# Patient Record
Sex: FEMALE | Race: WHITE | NOT HISPANIC OR LATINO | Employment: PART TIME | URBAN - METROPOLITAN AREA
[De-identification: names, ages, dates, MRNs, and addresses within clinical notes are randomized per-mention and may not be internally consistent; named-entity substitution may affect disease eponyms.]

---

## 2017-12-14 ENCOUNTER — OFFICE VISIT (OUTPATIENT)
Dept: UROGYNECOLOGY | Facility: CLINIC | Age: 27
End: 2017-12-14
Payer: MEDICAID

## 2017-12-14 VITALS
DIASTOLIC BLOOD PRESSURE: 70 MMHG | HEIGHT: 65 IN | SYSTOLIC BLOOD PRESSURE: 110 MMHG | WEIGHT: 117.75 LBS | BODY MASS INDEX: 19.62 KG/M2

## 2017-12-14 DIAGNOSIS — Z30.9 ENCOUNTER FOR CONTRACEPTIVE MANAGEMENT, UNSPECIFIED TYPE: Primary | ICD-10-CM

## 2017-12-14 DIAGNOSIS — Z01.419 WELL WOMAN EXAM: ICD-10-CM

## 2017-12-14 DIAGNOSIS — Z12.4 ENCOUNTER FOR PAPANICOLAOU SMEAR FOR CERVICAL CANCER SCREENING: ICD-10-CM

## 2017-12-14 DIAGNOSIS — Z30.41 ENCOUNTER FOR SURVEILLANCE OF CONTRACEPTIVE PILLS: ICD-10-CM

## 2017-12-14 LAB
B-HCG UR QL: NEGATIVE
CTP QC/QA: YES

## 2017-12-14 PROCEDURE — 99999 PR PBB SHADOW E&M-NEW PATIENT-LVL III: CPT | Mod: PBBFAC,,, | Performed by: NURSE PRACTITIONER

## 2017-12-14 PROCEDURE — 81025 URINE PREGNANCY TEST: CPT | Mod: PBBFAC | Performed by: NURSE PRACTITIONER

## 2017-12-14 PROCEDURE — 99385 PREV VISIT NEW AGE 18-39: CPT | Mod: S$PBB,,, | Performed by: NURSE PRACTITIONER

## 2017-12-14 PROCEDURE — 88175 CYTOPATH C/V AUTO FLUID REDO: CPT

## 2017-12-14 PROCEDURE — 99203 OFFICE O/P NEW LOW 30 MIN: CPT | Mod: PBBFAC | Performed by: NURSE PRACTITIONER

## 2017-12-14 RX ORDER — DESOGESTREL AND ETHINYL ESTRADIOL 0.15-0.03
1 KIT ORAL DAILY
COMMUNITY
End: 2017-12-14 | Stop reason: CLARIF

## 2017-12-14 RX ORDER — DESOGESTREL AND ETHINYL ESTRADIOL 0.15-0.03
1 KIT ORAL DAILY
Qty: 84 TABLET | Refills: 3 | Status: SHIPPED | OUTPATIENT
Start: 2017-12-14 | End: 2018-11-09 | Stop reason: SDUPTHER

## 2017-12-14 NOTE — PATIENT INSTRUCTIONS
1. Well woman  --pap today  --takes 2-3 weeks for results    2. Contraception  --continue ocp's    3. RTC 1 year

## 2017-12-14 NOTE — PROGRESS NOTES
"2017    SUBJECTIVE:   27 y.o. female for annual exam.    History reviewed. No pertinent past medical history.    History reviewed. No pertinent surgical history.    Current Outpatient Prescriptions   Medication Sig Dispense Refill    desogestrel-ethinyl estradiol (APRI) 0.15-0.03 mg per tablet Take 1 tablet by mouth once daily.       No current facility-administered medications for this visit.      Allergies: Clindamycin   Patient's last menstrual period was 2017.      OB History      Para Term  AB Living    0 0 0 0 0 0    SAB TAB Ectopic Multiple Live Births    0 0 0 0 0            Well Woman:  Pap:2 years ago-- normal per patient report  Mammo:n/a  Colonoscopy:n/a  Dexa:n/a      Family History  Family History   Problem Relation Age of Onset    Cervical cancer Neg Hx     Endometrial cancer Neg Hx     Vaginal cancer Neg Hx     Ovarian cancer Neg Hx       Colon CA: No  Breast CA: No   CA: No      ROS:  Feeling well.   No dyspnea or chest pain on exertion.    No abdominal pain, change in bowel habits, black or bloody stools.    No urinary tract symptoms.   GYN ROS: normal menses, no abnormal bleeding, pelvic pain or discharge, no breast pain or new or enlarging lumps on self exam.   No neurological complaints.    OBJECTIVE:   The patient appears well, alert, oriented x 3, in no distress.  /70 (BP Location: Right arm, Patient Position: Sitting)   Ht 5' 5" (1.651 m)   Wt 53.4 kg (117 lb 11.6 oz)   LMP 2017   BMI 19.59 kg/m²   ENT normal.  Neck supple. No adenopathy or thyromegaly. SHERLYN.   Lungs are clear, good air entry, no wheezes, rhonchi or rales.   S1 and S2 normal, no murmurs, regular rate and rhythm.   Abdomen soft without tenderness, guarding, mass or organomegaly.   Extremities show no edema, normal peripheral pulses.   Neurological is normal, no focal findings.    BREAST EXAM: breasts appear normal, no suspicious masses, no skin or nipple changes or axillary " nodes    PELVIC EXAM:   VULVA: normal appearing vulva with no masses, tenderness or lesions,   VAGINA: normal appearing vagina with normal color and discharge, no lesions,  CERVIX: normal appearing cervix without discharge or lesions,   UTERUS: uterus is normal size, shape, consistency and nontender,   ADNEXA: no masses,       ASSESSMENT:   No diagnosis found.    PLAN:   1. Well woman  --pap today  --takes 2-3 weeks for results    2. Contraception  --continue ocp's    3. RTC 1 year      30 minutes were spent in face to face time with this patient  75 % of this time was spent in counseling and/or coordination of care  Althea GAN [facility]  Division of Female Pelvic Medicine and Reconstructive Surgery  Department of Obstetrics & Gynecology

## 2017-12-26 ENCOUNTER — TELEPHONE (OUTPATIENT)
Dept: UROGYNECOLOGY | Facility: CLINIC | Age: 27
End: 2017-12-26

## 2017-12-26 NOTE — TELEPHONE ENCOUNTER
----- Message from Garrick Miranda sent at 12/26/2017  4:19 PM CST -----  Contact: Pt  X_ 1st Request  _ 2nd Request  _ 3rd Request    Who:  CHANDNI HINOJOSA [[id number]]    Why: Patient is returning a call    What Number to Call Back: [phone]    When to Expect a call back: (Before the end of the day)  -- if call after 3:00 call back will be tomorrow.

## 2017-12-26 NOTE — TELEPHONE ENCOUNTER
Called pt and relay message of negative pap smear results. Pt voiced understanding and call was ended.

## 2017-12-26 NOTE — TELEPHONE ENCOUNTER
----- Message from Althea Hinojosa NP sent at 12/26/2017  3:05 PM CST -----  Please let patient know her pap smear was normal.  MARJORIE Crystal-BC

## 2018-11-09 DIAGNOSIS — Z30.41 ENCOUNTER FOR SURVEILLANCE OF CONTRACEPTIVE PILLS: ICD-10-CM

## 2018-11-09 RX ORDER — DESOGESTREL AND ETHINYL ESTRADIOL 0.15-0.03
1 KIT ORAL DAILY
Qty: 84 TABLET | Refills: 0 | Status: SHIPPED | OUTPATIENT
Start: 2018-11-09 | End: 2019-02-01 | Stop reason: SDUPTHER

## 2018-11-09 NOTE — TELEPHONE ENCOUNTER
----- Message from Joy Alicia sent at 11/9/2018  2:15 PM CST -----  Contact: Pt   Can the clinic reply in [facility]: No     Please refill the medication(s) listed below. Please call the patient when the prescription(s) is ready for  at the phone number [phone]    Medication #1: desogestrel-ethinyl estradiol (APRI) 0.15-0.03 mg per tablet    Medication #2:    Preferred Pharmacy: [facility]

## 2018-11-09 NOTE — TELEPHONE ENCOUNTER
Spoke to pt, pt was contacted regarding her request for OCP refill. Pt was informed that her OCP's can be refilled for one month, but for continued refills she will need to schedule a follow up appointment. FU for WWE scheduled 1/4/2019 at the [facility] location with Althea, due to her travels over the holidays.  Pt aware and verbalizes understanding.

## 2019-01-30 ENCOUNTER — TELEPHONE (OUTPATIENT)
Dept: UROGYNECOLOGY | Facility: CLINIC | Age: 29
End: 2019-01-30

## 2019-01-30 NOTE — TELEPHONE ENCOUNTER
[facility] pharmacy sent over a refill request on Isibloom 0.15mg-0.3mg tablet 28s. Informed the pharmacy pt needed to schedule an appointment before rx refill can be approved due to pt not being seen since 2017.  Called pt to schedule a follow up apt for rx refill request, Unable to to leave message due to phone number being disconnected.

## 2019-02-01 ENCOUNTER — OFFICE VISIT (OUTPATIENT)
Dept: UROGYNECOLOGY | Facility: CLINIC | Age: 29
End: 2019-02-01
Payer: MEDICAID

## 2019-02-01 VITALS
SYSTOLIC BLOOD PRESSURE: 110 MMHG | DIASTOLIC BLOOD PRESSURE: 80 MMHG | HEIGHT: 65 IN | WEIGHT: 123.69 LBS | BODY MASS INDEX: 20.61 KG/M2

## 2019-02-01 DIAGNOSIS — Z30.41 ENCOUNTER FOR SURVEILLANCE OF CONTRACEPTIVE PILLS: ICD-10-CM

## 2019-02-01 DIAGNOSIS — Z01.419 WELL WOMAN EXAM: Primary | ICD-10-CM

## 2019-02-01 PROCEDURE — 99999 PR PBB SHADOW E&M-EST. PATIENT-LVL III: ICD-10-PCS | Mod: PBBFAC,,, | Performed by: NURSE PRACTITIONER

## 2019-02-01 PROCEDURE — 99395 PR PREVENTIVE VISIT,EST,18-39: ICD-10-PCS | Mod: S$PBB,,, | Performed by: NURSE PRACTITIONER

## 2019-02-01 PROCEDURE — 99395 PREV VISIT EST AGE 18-39: CPT | Mod: S$PBB,,, | Performed by: NURSE PRACTITIONER

## 2019-02-01 PROCEDURE — 99213 OFFICE O/P EST LOW 20 MIN: CPT | Mod: PBBFAC | Performed by: NURSE PRACTITIONER

## 2019-02-01 PROCEDURE — 99999 PR PBB SHADOW E&M-EST. PATIENT-LVL III: CPT | Mod: PBBFAC,,, | Performed by: NURSE PRACTITIONER

## 2019-02-01 RX ORDER — DESOGESTREL AND ETHINYL ESTRADIOL 0.15-0.03
KIT ORAL
Qty: 84 TABLET | Refills: 4 | Status: SHIPPED | OUTPATIENT
Start: 2019-02-01 | End: 2020-03-02

## 2019-02-01 NOTE — PROGRESS NOTES
"2019    SUBJECTIVE:   28 y.o. female for annual exam.    History reviewed. No pertinent past medical history.    History reviewed. No pertinent surgical history.    Current Outpatient Medications   Medication Sig Dispense Refill    desogestrel-ethinyl estradiol (APRI) 0.15-0.03 mg per tablet Take active pills continuously x 3 months, then withdraw for menses 84 tablet 4     No current facility-administered medications for this visit.      Allergies: Clindamycin   Patient's last menstrual period was 2019.      OB History      Para Term  AB Living    0 0 0 0 0 0    SAB TAB Ectopic Multiple Live Births    0 0 0 0 0            Well Woman:  Pap:2017 normal  Mammo:n/a  Colonoscopy:n/a  Dexa:n/a      Family History  Family History   Problem Relation Age of Onset    Cervical cancer Neg Hx     Endometrial cancer Neg Hx     Vaginal cancer Neg Hx     Ovarian cancer Neg Hx       Colon CA: No  Breast CA: No   CA: No      ROS:  Feeling well.   No dyspnea or chest pain on exertion.    No abdominal pain, change in bowel habits, black or bloody stools.    No urinary tract symptoms.   GYN ROS: normal menses, no abnormal bleeding, pelvic pain or discharge, no breast pain or new or enlarging lumps on self exam.   No neurological complaints. Having frontal headaches mostly on L side for two months--no vision changes--relieved with caffeine and tylenol    OBJECTIVE:   The patient appears well, alert, oriented x 3, in no distress.  /80 (BP Location: Right arm, Patient Position: Sitting, BP Method: Medium (Manual))   Ht 5' 5" (1.651 m)   Wt 56.1 kg (123 lb 10.9 oz)   LMP 2019   BMI 20.58 kg/m²   ENT normal.  Neck supple. No adenopathy or thyromegaly. SHERLYN.   Lungs are clear, good air entry, no wheezes, rhonchi or rales.   S1 and S2 normal, no murmurs, regular rate and rhythm.   Abdomen soft without tenderness, guarding, mass or organomegaly.   Extremities show no edema, normal peripheral " pulses.   Neurological is normal, no focal findings.    BREAST EXAM: breasts appear normal, no suspicious masses, no skin or nipple changes or axillary nodes    PELVIC EXAM:   VULVA: normal appearing vulva with no masses, tenderness or lesions,   VAGINA: normal appearing vagina with normal color and discharge, no lesions,  CERVIX: normal appearing cervix without discharge or lesions,   UTERUS: uterus is normal size, shape, consistency and nontender,   ADNEXA: no masses,       ASSESSMENT:   1. Well woman exam     2. Encounter for surveillance of contraceptive pills  desogestrel-ethinyl estradiol (APRI) 0.15-0.03 mg per tablet       PLAN:   1. Well woman  --pap due 2020    2. Contraception  --continue ocp's    3. RTC 1 year      30 minutes were spent in face to face time with this patient  75 % of this time was spent in counseling and/or coordination of care  Althea GAN [facility]  Division of Female Pelvic Medicine and Reconstructive Surgery  Department of Obstetrics & Gynecology

## 2020-03-02 DIAGNOSIS — Z30.41 ENCOUNTER FOR SURVEILLANCE OF CONTRACEPTIVE PILLS: ICD-10-CM

## 2020-03-02 RX ORDER — DESOGESTREL AND ETHINYL ESTRADIOL 0.15-0.03
KIT ORAL
Qty: 84 TABLET | Refills: 0 | Status: ON HOLD | OUTPATIENT
Start: 2020-03-02 | End: 2020-03-19 | Stop reason: HOSPADM

## 2020-03-02 NOTE — TELEPHONE ENCOUNTER
Pt is requesting a refill on desogestrel-ethinyl estradiol 0.15-0.03 mg. Pt annual appt is scheduled on 3/13/20 at 1:30 pm.

## 2020-03-02 NOTE — TELEPHONE ENCOUNTER
----- Message from German Molina sent at 3/2/2020  2:45 PM CST -----  PT NEEDS A   MONTH REFILL ON HER BIRTH CONTROL UNTIL SHE CAN GET [phone]

## 2020-03-13 ENCOUNTER — NURSE TRIAGE (OUTPATIENT)
Dept: ADMINISTRATIVE | Facility: CLINIC | Age: 30
End: 2020-03-13

## 2020-03-13 ENCOUNTER — HOSPITAL ENCOUNTER (INPATIENT)
Facility: HOSPITAL | Age: 30
LOS: 6 days | Discharge: HOME | DRG: 572 | End: 2020-03-19
Attending: EMERGENCY MEDICINE | Admitting: EMERGENCY MEDICINE
Payer: MEDICAID

## 2020-03-13 DIAGNOSIS — L08.9 NECK INFECTION: Primary | ICD-10-CM

## 2020-03-13 DIAGNOSIS — R22.1 LOCALIZED SWELLING, MASS OR LUMP OF NECK: ICD-10-CM

## 2020-03-13 DIAGNOSIS — R59.1 LYMPHADENOPATHY: ICD-10-CM

## 2020-03-13 DIAGNOSIS — R65.10 SIRS (SYSTEMIC INFLAMMATORY RESPONSE SYNDROME): ICD-10-CM

## 2020-03-13 DIAGNOSIS — Z03.89 RULED OUT FOR MYOCARDIAL INFARCTION: ICD-10-CM

## 2020-03-13 DIAGNOSIS — R21 RASH: ICD-10-CM

## 2020-03-13 DIAGNOSIS — E87.6 HYPOKALEMIA: ICD-10-CM

## 2020-03-13 DIAGNOSIS — R00.0 TACHYCARDIA: ICD-10-CM

## 2020-03-13 DIAGNOSIS — L02.11 NECK ABSCESS: ICD-10-CM

## 2020-03-13 DIAGNOSIS — R22.1 NECK MASS: ICD-10-CM

## 2020-03-13 PROBLEM — A41.9 SEPSIS: Status: ACTIVE | Noted: 2020-03-13

## 2020-03-13 LAB
ALBUMIN SERPL BCP-MCNC: 3 G/DL (ref 3.5–5.2)
ALP SERPL-CCNC: 85 U/L (ref 55–135)
ALT SERPL W/O P-5'-P-CCNC: 20 U/L (ref 10–44)
ANION GAP SERPL CALC-SCNC: 8 MMOL/L (ref 8–16)
AST SERPL-CCNC: 31 U/L (ref 10–40)
B-HCG UR QL: NEGATIVE
BASOPHILS # BLD AUTO: 0.03 K/UL (ref 0–0.2)
BASOPHILS NFR BLD: 0.2 % (ref 0–1.9)
BILIRUB SERPL-MCNC: 0.4 MG/DL (ref 0.1–1)
BUN SERPL-MCNC: 8 MG/DL (ref 6–20)
CALCIUM SERPL-MCNC: 8.5 MG/DL (ref 8.7–10.5)
CHLORIDE SERPL-SCNC: 106 MMOL/L (ref 95–110)
CO2 SERPL-SCNC: 22 MMOL/L (ref 23–29)
CREAT SERPL-MCNC: 0.6 MG/DL (ref 0.5–1.4)
CTP QC/QA: YES
DIFFERENTIAL METHOD: ABNORMAL
EOSINOPHIL # BLD AUTO: 0 K/UL (ref 0–0.5)
EOSINOPHIL NFR BLD: 0 % (ref 0–8)
ERYTHROCYTE [DISTWIDTH] IN BLOOD BY AUTOMATED COUNT: 12.8 % (ref 11.5–14.5)
EST. GFR  (AFRICAN AMERICAN): >60 ML/MIN/1.73 M^2
EST. GFR  (NON AFRICAN AMERICAN): >60 ML/MIN/1.73 M^2
GLUCOSE SERPL-MCNC: 96 MG/DL (ref 70–110)
HCT VFR BLD AUTO: 37 % (ref 37–48.5)
HETEROPH AB SERPL QL IA: NEGATIVE
HGB BLD-MCNC: 12 G/DL (ref 12–16)
IMM GRANULOCYTES # BLD AUTO: 0.14 K/UL (ref 0–0.04)
IMM GRANULOCYTES NFR BLD AUTO: 0.8 % (ref 0–0.5)
INFLUENZA A, MOLECULAR: NEGATIVE
INFLUENZA B, MOLECULAR: NEGATIVE
LYMPHOCYTES # BLD AUTO: 2.1 K/UL (ref 1–4.8)
LYMPHOCYTES NFR BLD: 11.7 % (ref 18–48)
MCH RBC QN AUTO: 30.3 PG (ref 27–31)
MCHC RBC AUTO-ENTMCNC: 32.4 G/DL (ref 32–36)
MCV RBC AUTO: 93 FL (ref 82–98)
MONOCYTES # BLD AUTO: 1.3 K/UL (ref 0.3–1)
MONOCYTES NFR BLD: 7.3 % (ref 4–15)
NEUTROPHILS # BLD AUTO: 14.1 K/UL (ref 1.8–7.7)
NEUTROPHILS NFR BLD: 80 % (ref 38–73)
NRBC BLD-RTO: 0 /100 WBC
PLATELET # BLD AUTO: 233 K/UL (ref 150–350)
PMV BLD AUTO: 11.6 FL (ref 9.2–12.9)
POTASSIUM SERPL-SCNC: 3.4 MMOL/L (ref 3.5–5.1)
PROT SERPL-MCNC: 8.4 G/DL (ref 6–8.4)
RBC # BLD AUTO: 3.96 M/UL (ref 4–5.4)
SODIUM SERPL-SCNC: 136 MMOL/L (ref 136–145)
SPECIMEN SOURCE: NORMAL
WBC # BLD AUTO: 17.62 K/UL (ref 3.9–12.7)

## 2020-03-13 PROCEDURE — 81025 URINE PREGNANCY TEST: CPT | Performed by: EMERGENCY MEDICINE

## 2020-03-13 PROCEDURE — 80053 COMPREHEN METABOLIC PANEL: CPT

## 2020-03-13 PROCEDURE — 87632 RESP VIRUS 6-11 TARGETS: CPT

## 2020-03-13 PROCEDURE — 85025 COMPLETE CBC W/AUTO DIFF WBC: CPT

## 2020-03-13 PROCEDURE — 99291 CRITICAL CARE FIRST HOUR: CPT | Mod: 25

## 2020-03-13 PROCEDURE — 99285 EMERGENCY DEPT VISIT HI MDM: CPT | Mod: ,,, | Performed by: EMERGENCY MEDICINE

## 2020-03-13 PROCEDURE — S0077 INJECTION, CLINDAMYCIN PHOSP: HCPCS | Performed by: EMERGENCY MEDICINE

## 2020-03-13 PROCEDURE — 93005 ELECTROCARDIOGRAM TRACING: CPT

## 2020-03-13 PROCEDURE — 25000003 PHARM REV CODE 250: Performed by: EMERGENCY MEDICINE

## 2020-03-13 PROCEDURE — 63600175 PHARM REV CODE 636 W HCPCS: Performed by: EMERGENCY MEDICINE

## 2020-03-13 PROCEDURE — 99285 PR EMERGENCY DEPT VISIT,LEVEL V: ICD-10-PCS | Mod: ,,, | Performed by: EMERGENCY MEDICINE

## 2020-03-13 PROCEDURE — 87502 INFLUENZA DNA AMP PROBE: CPT

## 2020-03-13 PROCEDURE — 11000001 HC ACUTE MED/SURG PRIVATE ROOM

## 2020-03-13 PROCEDURE — 86308 HETEROPHILE ANTIBODY SCREEN: CPT

## 2020-03-13 PROCEDURE — U0002 COVID-19 LAB TEST NON-CDC: HCPCS

## 2020-03-13 RX ORDER — ONDANSETRON 2 MG/ML
4 INJECTION INTRAMUSCULAR; INTRAVENOUS EVERY 8 HOURS PRN
Status: DISCONTINUED | OUTPATIENT
Start: 2020-03-13 | End: 2020-03-16

## 2020-03-13 RX ORDER — IBUPROFEN 200 MG
16 TABLET ORAL
Status: DISCONTINUED | OUTPATIENT
Start: 2020-03-13 | End: 2020-03-19 | Stop reason: HOSPADM

## 2020-03-13 RX ORDER — KETOROLAC TROMETHAMINE 10 MG/1
10 TABLET, FILM COATED ORAL EVERY 6 HOURS PRN
Status: DISPENSED | OUTPATIENT
Start: 2020-03-13 | End: 2020-03-14

## 2020-03-13 RX ORDER — POTASSIUM CHLORIDE 20 MEQ/1
40 TABLET, EXTENDED RELEASE ORAL ONCE
Status: DISCONTINUED | OUTPATIENT
Start: 2020-03-14 | End: 2020-03-13

## 2020-03-13 RX ORDER — CLINDAMYCIN PHOSPHATE 600 MG/50ML
600 INJECTION, SOLUTION INTRAVENOUS
Status: DISCONTINUED | OUTPATIENT
Start: 2020-03-13 | End: 2020-03-13

## 2020-03-13 RX ORDER — POTASSIUM CHLORIDE 20 MEQ/1
40 TABLET, EXTENDED RELEASE ORAL
Status: COMPLETED | OUTPATIENT
Start: 2020-03-13 | End: 2020-03-13

## 2020-03-13 RX ORDER — ENOXAPARIN SODIUM 100 MG/ML
40 INJECTION SUBCUTANEOUS EVERY 24 HOURS
Status: DISCONTINUED | OUTPATIENT
Start: 2020-03-14 | End: 2020-03-17

## 2020-03-13 RX ORDER — SODIUM CHLORIDE 0.9 % (FLUSH) 0.9 %
10 SYRINGE (ML) INJECTION
Status: DISCONTINUED | OUTPATIENT
Start: 2020-03-13 | End: 2020-03-17

## 2020-03-13 RX ORDER — TALC
6 POWDER (GRAM) TOPICAL NIGHTLY PRN
Status: DISCONTINUED | OUTPATIENT
Start: 2020-03-13 | End: 2020-03-19 | Stop reason: HOSPADM

## 2020-03-13 RX ORDER — CLINDAMYCIN HYDROCHLORIDE 150 MG/1
450 CAPSULE ORAL EVERY 8 HOURS
Status: DISCONTINUED | OUTPATIENT
Start: 2020-03-13 | End: 2020-03-13

## 2020-03-13 RX ORDER — DEXAMETHASONE SODIUM PHOSPHATE 4 MG/ML
8 INJECTION, SOLUTION INTRA-ARTICULAR; INTRALESIONAL; INTRAMUSCULAR; INTRAVENOUS; SOFT TISSUE
Status: COMPLETED | OUTPATIENT
Start: 2020-03-13 | End: 2020-03-13

## 2020-03-13 RX ORDER — ACETAMINOPHEN 325 MG/1
650 TABLET ORAL EVERY 4 HOURS PRN
Status: DISCONTINUED | OUTPATIENT
Start: 2020-03-13 | End: 2020-03-19 | Stop reason: HOSPADM

## 2020-03-13 RX ORDER — DEXAMETHASONE SODIUM PHOSPHATE 4 MG/ML
8 INJECTION, SOLUTION INTRA-ARTICULAR; INTRALESIONAL; INTRAMUSCULAR; INTRAVENOUS; SOFT TISSUE EVERY 8 HOURS
Status: DISCONTINUED | OUTPATIENT
Start: 2020-03-14 | End: 2020-03-16

## 2020-03-13 RX ORDER — IBUPROFEN 200 MG
24 TABLET ORAL
Status: DISCONTINUED | OUTPATIENT
Start: 2020-03-13 | End: 2020-03-19 | Stop reason: HOSPADM

## 2020-03-13 RX ORDER — PROCHLORPERAZINE EDISYLATE 5 MG/ML
5 INJECTION INTRAMUSCULAR; INTRAVENOUS EVERY 6 HOURS PRN
Status: DISCONTINUED | OUTPATIENT
Start: 2020-03-13 | End: 2020-03-19 | Stop reason: HOSPADM

## 2020-03-13 RX ORDER — ACETAMINOPHEN 325 MG/1
650 TABLET ORAL
Status: COMPLETED | OUTPATIENT
Start: 2020-03-13 | End: 2020-03-13

## 2020-03-13 RX ORDER — IPRATROPIUM BROMIDE AND ALBUTEROL SULFATE 2.5; .5 MG/3ML; MG/3ML
3 SOLUTION RESPIRATORY (INHALATION) EVERY 6 HOURS PRN
Status: DISCONTINUED | OUTPATIENT
Start: 2020-03-13 | End: 2020-03-16

## 2020-03-13 RX ORDER — ONDANSETRON 4 MG/1
4 TABLET, ORALLY DISINTEGRATING ORAL EVERY 6 HOURS PRN
Status: DISCONTINUED | OUTPATIENT
Start: 2020-03-13 | End: 2020-03-19 | Stop reason: HOSPADM

## 2020-03-13 RX ORDER — SODIUM CHLORIDE 0.9 % (FLUSH) 0.9 %
10 SYRINGE (ML) INJECTION
Status: DISCONTINUED | OUTPATIENT
Start: 2020-03-13 | End: 2020-03-19 | Stop reason: HOSPADM

## 2020-03-13 RX ORDER — VANCOMYCIN HCL IN 5 % DEXTROSE 1G/250ML
1000 PLASTIC BAG, INJECTION (ML) INTRAVENOUS
Status: DISCONTINUED | OUTPATIENT
Start: 2020-03-14 | End: 2020-03-15

## 2020-03-13 RX ADMIN — SODIUM CHLORIDE 1000 ML: 0.9 INJECTION, SOLUTION INTRAVENOUS at 04:03

## 2020-03-13 RX ADMIN — ACETAMINOPHEN 650 MG: 325 TABLET ORAL at 06:03

## 2020-03-13 RX ADMIN — CLINDAMYCIN IN 5 PERCENT DEXTROSE 600 MG: 12 INJECTION, SOLUTION INTRAVENOUS at 07:03

## 2020-03-13 RX ADMIN — KETOROLAC TROMETHAMINE 10 MG: 10 TABLET, FILM COATED ORAL at 06:03

## 2020-03-13 RX ADMIN — SODIUM CHLORIDE, SODIUM LACTATE, POTASSIUM CHLORIDE, AND CALCIUM CHLORIDE 1000 ML: .6; .31; .03; .02 INJECTION, SOLUTION INTRAVENOUS at 06:03

## 2020-03-13 RX ADMIN — POTASSIUM CHLORIDE 40 MEQ: 1500 TABLET, EXTENDED RELEASE ORAL at 04:03

## 2020-03-13 RX ADMIN — SODIUM CHLORIDE 1000 ML: 0.9 INJECTION, SOLUTION INTRAVENOUS at 01:03

## 2020-03-13 RX ADMIN — DEXAMETHASONE SODIUM PHOSPHATE 8 MG: 4 INJECTION, SOLUTION INTRA-ARTICULAR; INTRALESIONAL; INTRAMUSCULAR; INTRAVENOUS; SOFT TISSUE at 06:03

## 2020-03-13 NOTE — ED PROVIDER NOTES
Encounter Date: 3/13/2020       History     Chief Complaint   Patient presents with    Oral Swelling     Pt reports throat swelling since Tuesday. Pt received antibiotics from . Pt went to ER and had CT scan which was inconclusive.      30 yo W presents with a chief complaint of neck pain and swelling.  Patient reports left neck pain and swelling began 3 days prior.  At onset of symptoms, patient had a fever up to 104°.  It was associated with flu-like symptoms including diarrhea, myalgias, headache, ear pain.  Most of the symptoms have since resolved but patient had a fever of 102 yesterday and neck pain persisted so she went to urgent care.  At urgent care she was found to be influenza negative.  She appeared to have received an IM injection of Toradol and Zosyn and referred to the emergency department.  While in the ED, patient had a CT that revealed left cervical chain lymphadenopathy with left anterolateral neck swelling and edema.  Hypodense rim enhancing collection is seen which may represent left neck abscess versus necrotic/suppurative lymph node or necrotic lesion.  Infectious and neoplastic processes should be considered.  Patient experienced some improvement of symptoms and elected to pursue outpatient follow-up.  She was instructed to call the nurse hotline if her condition worsened.  This morning, patient awoke with worsening pain and swelling.  She feels that the swelling is now spreading to the front of her neck.  She feels that her throat is getting compressed.  Pain was severe this morning and could barely talk in needed her boyfriend to help left upper head.  She called the nurse hotline who recommended she come to the emergency department.  Patient was able to take a Toradol and has since experienced some improvement in pain.  Currently at a 6/10.  She still reports change in voice it is force.  She feels spit pull in the back of her throat.  She was able to swallow putting to take the  Toradol.  But she has not taken the clindamycin yet because she was concerned that she didn't eat enough.  No fever x24 hr.  No recent travel.  Patient did report that she had some URI symptoms head cold 7 days prior.  This resolved 6 days prior.  Five days prior, patient went to a birthday party and was around a group of people.  She has had no recent travel.  No exposure to those under suspicion for COVID.  No chest pain or shortness of breath.  She has not received her influenza    From medical history standpoint, patient feels that she has an undiagnosed autoimmune issue. Pt gets symmetrical rash and swelling to legs  that last occurred 5 days prior.  It has since resolved.  Additionally, in 2013 patient had an episode of parotitis.  Patient was initially prescribed clindamycin but developed vomiting and she was subsequently switched to another antibiotic that resolved it.  Additionally, patient had 2 spontaneous pneumothoraces in 2015.          Review of patient's allergies indicates:   Allergen Reactions    Clindamycin Nausea And Vomiting     History reviewed. No pertinent past medical history.  History reviewed. No pertinent surgical history.  Family History   Problem Relation Age of Onset    Cervical cancer Neg Hx     Endometrial cancer Neg Hx     Vaginal cancer Neg Hx     Ovarian cancer Neg Hx      Social History     Tobacco Use    Smoking status: Never Smoker    Smokeless tobacco: Never Used   Substance Use Topics    Alcohol use: Yes    Drug use: No     Review of Systems   Constitutional: Positive for appetite change, chills and fever.   HENT: Positive for congestion, ear pain, sore throat, trouble swallowing and voice change. Negative for ear discharge.    Eyes: Negative for discharge.   Respiratory: Negative for cough and shortness of breath.    Cardiovascular: Negative for chest pain.   Gastrointestinal: Negative for abdominal pain.   Endocrine: Negative for polyuria.   Genitourinary:  Negative for dysuria.   Musculoskeletal: Negative for back pain.   Skin: Negative for wound.   Allergic/Immunologic: Negative for immunocompromised state.   Neurological: Positive for headaches.   Hematological: Positive for adenopathy. Does not bruise/bleed easily.   Psychiatric/Behavioral: Negative for confusion.       Physical Exam     Initial Vitals   BP Pulse Resp Temp SpO2   03/13/20 1227 03/13/20 1215 03/13/20 1227 03/13/20 1215 03/13/20 1215   136/83 (!) 120 (!) 22 100 °F (37.8 °C) 100 %      MAP       --                Physical Exam    Nursing note and vitals reviewed.  Constitutional: She appears well-developed and well-nourished. She is not diaphoretic. No distress.   HENT:   Head: Normocephalic and atraumatic.   Posterior oropharynx clear with uvula midline, no erythema, no tonsillar enlargement or exudate  Tolerating secretions without difficulty  No muffled voice, but it is somewhat hoarse  TMs clear bilat   Eyes: EOM are normal. Right eye exhibits no discharge. Left eye exhibits no discharge. No scleral icterus.   Neck: Normal range of motion. No thyromegaly present. No JVD present.   7 cm of induration along left submandibular region extending posteriorly and inferiorly, inferior left cervical chain with lymphadenopathy, no stridor, tenderness to palpation of lymphadenopathy and some of anterior trachea  No erythema, warmth, skin changes throughout neck   Cardiovascular: Regular rhythm and intact distal pulses. Exam reveals no gallop and no friction rub.    No murmur heard.  Mild tachycardia to low 100s   Pulmonary/Chest: Breath sounds normal. No respiratory distress. She has no wheezes. She has no rhonchi. She has no rales. She exhibits no tenderness.   Patient speaking complete sentences on room air no acute distress  No tachypnea noted   Abdominal: Soft. Bowel sounds are normal. She exhibits no distension and no mass. There is no tenderness. There is no rebound and no guarding.   Musculoskeletal:  Normal range of motion. She exhibits no edema or tenderness.   Lymphadenopathy:     She has cervical adenopathy.   Neurological: She is alert and oriented to person, place, and time. She has normal strength. No cranial nerve deficit or sensory deficit.   Skin: Skin is warm and dry. Capillary refill takes less than 2 seconds.   Psychiatric:   Mildly anxious, but redirectable         ED Course   Procedures  Labs Reviewed   CBC W/ AUTO DIFFERENTIAL - Abnormal; Notable for the following components:       Result Value    WBC 17.62 (*)     RBC 3.96 (*)     Immature Granulocytes 0.8 (*)     Gran # (ANC) 14.1 (*)     Immature Grans (Abs) 0.14 (*)     Mono # 1.3 (*)     Gran% 80.0 (*)     Lymph% 11.7 (*)     All other components within normal limits   COMPREHENSIVE METABOLIC PANEL - Abnormal; Notable for the following components:    Potassium 3.4 (*)     CO2 22 (*)     Calcium 8.5 (*)     Albumin 3.0 (*)     All other components within normal limits   INFLUENZA A & B BY MOLECULAR   RESPIRATORY VIRAL PANEL BY PCR   HETEROPHILE AB SCREEN   SARS-COV-2 (COVID-19) QUALITATIVE PCR   POCT URINE PREGNANCY     EKG Readings: (Independently Interpreted)   Rhythm: Sinus Tachycardia. Heart Rate: 102. ST Segments: Normal ST Segments. T Waves: Normal. Axis: Right Axis Deviation.     ECG Results          EKG 12-lead (Final result)  Result time 03/13/20 16:45:11    Final result by Interface, Lab In [facility] (03/13/20 16:45:11)                 Narrative:    Test Reason : R00.0,    Vent. Rate : 102 BPM     Atrial Rate : 102 BPM     P-R Int : 124 ms          QRS Dur : 082 ms      QT Int : 326 ms       P-R-T Axes : 080 091 049 degrees     QTc Int : 424 ms    Age and gender specific analysis  Sinus tachycardia  Right atrial enlargement  Rightward axis  Pulmonary disease pattern  Abnormal ECG  No previous ECGs available  Confirmed by DENY THOMAS MD ([id number]) on 3/13/2020 4:44:58 PM    Referred By: [email or web address]   SELF           Confirmed By:DENY THOMAS  MD                            Imaging Results    None          Medical Decision Making:   History:   I obtained history from: someone other than patient.  Old Medical Records: I decided to obtain old medical records.  Initial Assessment:   30 yo W with pmhx spontaneous ptx x2 (2015), parotitis (2013) presents with a chief complaint of neck pain and swelling.  Initially this seemed to have worsened from yesterday when she had her CT scan but after taking Toradol, patient reports significant improvement.  She is now hungry and does not appear to be in respiratory distress. Of note, patient was very tachycardic on arrival to the 140s.  Patient was informed that given the ongoing COVID outbreak, she must be  from her boyfriend come to the emergency department alone.  She was very tearful and anxious.  On my exam, her vitals were improved.  Differential Diagnosis:   Abscess, lymphoma, reactive lymph nodes, viral syndrome, influenza, mono, COVID-19, infectious issues  Clinical Tests:   Lab Tests: Ordered  Radiological Study: Reviewed  Medical Tests: Ordered  ED Management:  Despite the initial vital signs abnormalities, patient was much more calm and on my evaluation her vitals were normal.  I do not feel that she is septic.  Patient was able to take her home antibiotic by mouth and eat pudding in the ED. Airway does appear stable.  Will obtain Monospot, influenza test, respiratory viral panel, COVID-19 testing.  Will obtain labs.  ENT consulted for recommendations.    Reassessment:  CBC with a leukocytosis of 17.6.  This improved from 20 of yesterday.  Hemoglobin reassuring.  Labs with mild hypokalemia, will replace.  Influenza is negative.  Repeat vital signs did document hypoxia of 91% but this appears to be erroneous.  On repeat assessment, patient has a temperature of 99.7°, tachycardic with heart rate 112, respirations 22, pulse of 112.  She appears very well at this time.  Again, I do not feel that she is  septic.  She has been able tolerate p.o..  She is handling secretions and tolerating p.o. without difficulty.  Will administer additional 1 L bolus.  Case discussed with ENT.  Initial resident evaluated patient and is concern for possible lymphoma versus suppurative lymphadenitis.  They are making decision whether to admit for IV antibiotics versus biopsy.  Additionally, they plan to perform a nasopharyngeal scope.  Due to COVID-19, the ENT department is actively developing a policy on how to manage r/o COVID pts and they informed myself that they will not be able to make a decision until after the policy is solidified at 1730.    Reassessment:  ENT elects not to perform nasopharyngeal scope at this time.  They feel that should patient stay overnight, they will evaluate her in the morning to see if she needs to have the mass drained, otherwise she may follow-up as an outpatient.  On repeat assessment, patient is tearful and feels that the swelling has worsened.  She remains phonating well and tolerating her secretions.  I do not appreciate any change in the size of the mass.  No stridor.  Patient remains tachycardic but she is tearful.  In the setting of multiple doses of oral antibiotics and persistent pain and swelling, will bring in the hospital overnight for IV antibiotics for suppurative lymphadenitis.  Will administer Tylenol, steroids, additional IV fluids.  Obs per case management.  Given tachycardia, tachypnea, leukocytosis, patient has SIRS criteria and hospitalist recommends inpatient.  Patient does not appear septic to me.  She has some elevated heart rate and respiratory rate readings when crying but when calm they appear normal.  She appears stable for admission to [facility].                                       Clinical Impression:       ICD-10-CM ICD-9-CM   1. Neck infection L08.9 136.9   2. Tachycardia R00.0 785.0   3. Localized swelling, mass or lump of neck R22.1 784.2   4. Lymphadenopathy R59.1 785.6    5. Hypokalemia E87.6 276.8   6. SIRS (systemic inflammatory response syndrome) R65.10 995.90         Disposition:   Disposition: Admitted     ED Disposition Condition    Admit                           Jonas Ramirez MD  03/13/20 1759       Jonas Ramirez MD  03/13/20 1808

## 2020-03-13 NOTE — ED TRIAGE NOTES
Pt reports diarrhea x 1 week with intermittent nausea. Pt with fevers and running nose. Pt reports recent negative flu swab. Denies cough.

## 2020-03-13 NOTE — TELEPHONE ENCOUNTER
Reason for Disposition   Nursing judgment    Protocols used: ST NO PROTOCOL CALL: SICK ADULT-A-[place]      Dee was seen in [facility] ED last night for neck swelling, not discharged until 0815 this morning.  She called triage line now to report the swelling is significantly worse.  Was confined to the left, but now has begun swelling under the chin, mid-throat area, and entire neck is painful.  Says she is having difficulty swallowing.  No difficulty breathing. Per [facility] triage protocol, recommend return to ED now for evaluation.  She states she has not been able to eat anything due to the swelling of her neck and difficulty swallowing, and says she will try to eat a few bites of food before going to ED.  Strongly encourage her to leave now, do not eat anything prior to evaluation, and go to [facility] ED, as ED wait time is less now than [facility].  She states she will.  No pcp.  Please contact caller directly with any additional care advice.

## 2020-03-13 NOTE — ED NOTES
Bed: 29  Expected date: 3/13/20  Expected time: 12:33 PM  Means of arrival:   Comments:  prashanth

## 2020-03-13 NOTE — ED NOTES
LOC: The patient is awake, alert, and oriented to place, time, situation. Affect is appropriate.  Speech is appropriate and clear.     APPEARANCE: Patient resting comfortably in no acute distress.  Patient is clean and well groomed.    SKIN: The skin is warm and dry; color consistent with ethnicity.  Patient has normal skin turgor and moist mucus membranes.  Skin intact; no breakdown or bruising noted.     MUSCULOSKELETAL: Patient moving upper and lower extremities without difficulty.  Denies weakness.     RESPIRATORY: Airway is open and patent. Respirations spontaneous, even, easy, and non-labored.  Patient has a normal effort and rate.  No accessory muscle use noted. Denies cough.     CARDIAC:  Normal rhythm and rate noted.  No peripheral edema noted. No complaints of chest pain.      ABDOMEN: Soft and non tender to palpation.  No distention noted. Pt reports intermittent diarrhea and nausea and vomiting x1 episode.    NEUROLOGIC: Eyes open spontaneously.  Behavior appropriate to situation.  Follows commands; facial expression symmetrical.  Purposeful motor response noted; normal sensation in all extremities.

## 2020-03-13 NOTE — CONSULTS
[facility]  Otorhinolaryngology-Head & Neck Surgery  Consult Note    Patient Name: Dee Mills  MRN: [id number]  Code Status: No Order  Admission Date: 3/13/2020  Hospital Length of Stay: 0 days  Attending Physician: Jonas Ramirez MD  Primary Care Provider: Primary Doctor No    Patient information was obtained from patient.     Inpatient consult to ENT  Consult performed by: Isidro Lees MD  Consult ordered by: Jonas Ramirez MD        Subjective:     Chief Complaint/Reason for Admission: left neck swelling with fevers    History of Present Illness: 29 y/o F presents to the ED with left anterior neck swelling that started two days ago (3/11/19). Patient reports that last Friday and Saturday, she felt like she had a head cold, which appeared to completely resolve on Sunday and Monday. On Tuesday (3/10/19), patient says she woke up with a fever of 100, that worsened throughout the day to a Tmax of 104. She says that she also experienced left neck pain, but no swelling. She noticed the left anterior neck swelling start on Wednesday, two days ago. She presented to urgent care on Thursday, who subsequently sent her to the ED. She had a CT, which was read as inconclusive, and discharged on clindamycin, ketorolac, and ondansetron (hx of nausea with clindamycin). She says that this morning, she felt the swelling was significantly worse, and therefore presented to the ED. She notes she was nearly aphonic this morning, though speaking clearly now, but also states she feels her voice is not at baseline and is somewhat raspy compared to normal. She also complains of trouble/pain with swallowing, which has improved since this morning. Associated symptoms include headache, bilateral ear pain L>R, and myalgias.       Medications:  Continuous Infusions:   Scheduled Meds: clindamycin  PRN Meds:  ketorolac, ondansetron     Current Facility-Administered Medications on File Prior to Encounter   Medication     [COMPLETED] clindamycin capsule 450 mg    [COMPLETED] iohexoL (OMNIPAQUE 350) injection 75 mL    [COMPLETED] ketorolac injection 15 mg    [COMPLETED] ondansetron disintegrating tablet 4 mg    [COMPLETED] piperacillin-tazobactam 4.5 g in dextrose 5 % 100 mL IVPB (ready to mix system)    [COMPLETED] sodium chloride 0.9% bolus 1,000 mL    [COMPLETED] sodium chloride 0.9% bolus 1,000 mL     Current Outpatient Medications on File Prior to Encounter   Medication Sig    clindamycin (CLEOCIN) 150 MG capsule Take 3 capsules (450 mg total) by mouth 3 (three) times daily. for 7 days    ISIBLOOM 0.15-0.03 mg per tablet TAKE ACTIVE PILLS CONTIUOUSLY FOR 3 MONTHS, THEN WITHDRAW FOR MENSES    ketorolac (TORADOL) 10 mg tablet Take 1 tablet (10 mg total) by mouth every 6 (six) hours as needed for Pain. If needed, wait 2 days between last dose and getting refill    ondansetron (ZOFRAN-ODT) 4 MG TbDL Take 1 tablet (4 mg total) by mouth every 6 (six) hours as needed (nausea or vomiting).       Review of patient's allergies indicates:   Allergen Reactions    Clindamycin Nausea And Vomiting       PMH:  Parotitis, two spontaneous pneumothoraces (one requiring a chest tube), gout, Raynaud's  Patient also concerned about a long-standing autoimmune process (describes severe leg swelling)    History reviewed. No pertinent surgical history.  Family History     Lung cancer in grandmother, raynaud's         Tobacco Use    Smoking status: Once or twice a month    Smokeless tobacco: Never Used   Substance and Sexual Activity    Alcohol use: Yes    Drug use: Marijuana    Sexual activity: Yes     Partners: Male     Birth control/protection: On birth control, no barrier protection     Review of Systems   Constitutional: Positive for fatigue and fever.   HENT: Positive for ear pain, facial swelling, sore throat, trouble swallowing and voice change. Negative for drooling, ear discharge and hearing loss.    Eyes: Negative for redness and  visual disturbance.   Respiratory: Negative for apnea, shortness of breath, wheezing and stridor.    Cardiovascular: Negative for chest pain and palpitations.   Gastrointestinal: Negative for abdominal distention, abdominal pain and nausea.   Endocrine: Negative.  Negative for cold intolerance and heat intolerance.   Genitourinary: Negative.    Musculoskeletal: Positive for myalgias and neck pain.   Skin: Negative for color change and rash.   Neurological: Positive for speech difficulty and headaches. Negative for dizziness and numbness.   Hematological: Positive for adenopathy.   Psychiatric/Behavioral: Negative for agitation, behavioral problems and confusion.     Objective:     Vital Signs (Most Recent):  Temp: 99.7 °F (37.6 °C) (03/13/20 1543)  Pulse: (!) 112 (03/13/20 1542)  Resp: (!) 22 (03/13/20 1227)  BP: 114/68 (03/13/20 1417)  SpO2: 97 % (03/13/20 1542) Vital Signs (24h Range):  Temp:  [97.9 °F (36.6 °C)-100 °F (37.8 °C)] 99.7 °F (37.6 °C)  Pulse:  [102-143] 112  Resp:  [14-22] 22  SpO2:  [91 %-100 %] 97 %  BP: (102-136)/(59-83) 114/68     Weight: 53.1 kg (117 lb)  Body mass index is 19.47 kg/m².        Constitutional:   She is oriented to person, place, and time. She appears well-developed and well-nourished. She appears alert. Normal speech.      Head:  Normocephalic and atraumatic.     Ears:    Right Ear: No drainage. Tympanic membrane is not erythematous. No middle ear effusion.   Left Ear: No drainage. Tympanic membrane is not erythematous.  No middle ear effusion.     Nose:  Nose normal including turbinates, nasal mucosa, sinuses and nasal septum.     Mouth/Throat  Oropharynx clear and moist without lesions or asymmetry. No oropharyngeal exudate (Left oropharynx slightly maldonado compared to right). +1.          Neck:      She has cervical adenopathy (4 x 6 cm area of submandibular fullness, diffuse adenopathy palpable throughout bilateral anterior neck, tender to palpation throughout L>R).      Cardiovascular:   Regular rhythm.      Pulmonary/Chest:   Effort normal.     Psychiatric:   She has a normal mood and affect. Her speech is normal.     Neurological:   She is alert and oriented to person, place, and time.     Skin:   No abrasions, lacerations, lesions, or rashes.     Flexible laryngoscopy deferred at this time due to COVID rule-out status.    Significant Labs:  CBC:   Recent Labs   Lab 03/13/20  1402   WBC 17.62*   RBC 3.96*   HGB 12.0   HCT 37.0      MCV 93   MCH 30.3   MCHC 32.4     BMP:   Recent Labs   Lab 03/13/20  1402   GLU 96      CO2 22*   BUN 8   CREATININE 0.6   CALCIUM 8.5*          Significant Diagnostics:  I have reviewed all pertinent imaging results/findings within the past 24 hours.    Assessment/Plan:     Neck mass  30 y/o F with a left submandibular neck mass present since 3/11/19 and fevers since 3/10/19. CT scan shows diffuse lymphadenopathy with left hypodense rim enhancing collection which may represent left neck abscess versus necrotic/suppurative lymph node or necrotic lesion. WBC 17.6. Febrile. Currently on a course of clindamycin. No respiratory distress. Reported hoarseness compared to baseline. Flexible laryngoscopy deferred at this time due to COVID rule-out status. Concern for lymphoproliferative process (lymphoma?) vs suppurative lymphadenitis.    - defer flexible laryngoscopy for now, airway stable  - may consider consulting medicine to evaluate need for IV antibiotics  - possible drainage to be further evaluated at a later time  - recommend IV abx, decadron 8 mg q 8 hours  - NPO at midnight. Stop NSAIDs  - discussed with staff      VTE Risk Mitigation (From admission, onward)    None          Thank you for your consult. I will follow-up with patient. Please contact us if you have any additional questions.

## 2020-03-13 NOTE — SUBJECTIVE & OBJECTIVE
Medications:  Continuous Infusions:   Scheduled Meds: clindamycin  PRN Meds:  ketorolac, ondansetron     Current Facility-Administered Medications on File Prior to Encounter   Medication    [COMPLETED] clindamycin capsule 450 mg    [COMPLETED] iohexoL (OMNIPAQUE 350) injection 75 mL    [COMPLETED] ketorolac injection 15 mg    [COMPLETED] ondansetron disintegrating tablet 4 mg    [COMPLETED] piperacillin-tazobactam 4.5 g in dextrose 5 % 100 mL IVPB (ready to mix system)    [COMPLETED] sodium chloride 0.9% bolus 1,000 mL    [COMPLETED] sodium chloride 0.9% bolus 1,000 mL     Current Outpatient Medications on File Prior to Encounter   Medication Sig    clindamycin (CLEOCIN) 150 MG capsule Take 3 capsules (450 mg total) by mouth 3 (three) times daily. for 7 days    ISIBLOOM 0.15-0.03 mg per tablet TAKE ACTIVE PILLS CONTIUOUSLY FOR 3 MONTHS, THEN WITHDRAW FOR MENSES    ketorolac (TORADOL) 10 mg tablet Take 1 tablet (10 mg total) by mouth every 6 (six) hours as needed for Pain. If needed, wait 2 days between last dose and getting refill    ondansetron (ZOFRAN-ODT) 4 MG TbDL Take 1 tablet (4 mg total) by mouth every 6 (six) hours as needed (nausea or vomiting).       Review of patient's allergies indicates:   Allergen Reactions    Clindamycin Nausea And Vomiting       PMH:  Parotitis, two spontaneous pneumothoraces (one requiring a chest tube), gout, Raynaud's  Patient also concerned about a long-standing autoimmune process (describes severe leg swelling)    History reviewed. No pertinent surgical history.  Family History     Lung cancer in grandmother, raynaud's         Tobacco Use    Smoking status: Once or twice a month    Smokeless tobacco: Never Used   Substance and Sexual Activity    Alcohol use: Yes    Drug use: Marijuana    Sexual activity: Yes     Partners: Male     Birth control/protection: On birth control, no barrier protection     Review of Systems   Constitutional: Positive for fatigue and  fever.   HENT: Positive for ear pain, facial swelling, sore throat, trouble swallowing and voice change. Negative for drooling, ear discharge and hearing loss.    Eyes: Negative for redness and visual disturbance.   Respiratory: Negative for apnea, shortness of breath, wheezing and stridor.    Cardiovascular: Negative for chest pain and palpitations.   Gastrointestinal: Negative for abdominal distention, abdominal pain and nausea.   Endocrine: Negative.  Negative for cold intolerance and heat intolerance.   Genitourinary: Negative.    Musculoskeletal: Positive for myalgias and neck pain.   Skin: Negative for color change and rash.   Neurological: Positive for speech difficulty and headaches. Negative for dizziness and numbness.   Hematological: Positive for adenopathy.   Psychiatric/Behavioral: Negative for agitation, behavioral problems and confusion.     Objective:     Vital Signs (Most Recent):  Temp: 99.7 °F (37.6 °C) (03/13/20 1543)  Pulse: (!) 112 (03/13/20 1542)  Resp: (!) 22 (03/13/20 1227)  BP: 114/68 (03/13/20 1417)  SpO2: 97 % (03/13/20 1542) Vital Signs (24h Range):  Temp:  [97.9 °F (36.6 °C)-100 °F (37.8 °C)] 99.7 °F (37.6 °C)  Pulse:  [102-143] 112  Resp:  [14-22] 22  SpO2:  [91 %-100 %] 97 %  BP: (102-136)/(59-83) 114/68     Weight: 53.1 kg (117 lb)  Body mass index is 19.47 kg/m².        Constitutional:   She is oriented to person, place, and time. She appears well-developed and well-nourished. She appears alert. Normal speech.      Head:  Normocephalic and atraumatic.     Ears:    Right Ear: No drainage. Tympanic membrane is not erythematous. No middle ear effusion.   Left Ear: No drainage. Tympanic membrane is not erythematous.  No middle ear effusion.     Nose:  Nose normal including turbinates, nasal mucosa, sinuses and nasal septum.     Mouth/Throat  Oropharynx clear and moist without lesions or asymmetry. No oropharyngeal exudate (Left oropharynx slightly maldonado compared to right). +1.           Neck:      She has cervical adenopathy (4 x 6 cm area of submandibular fullness, diffuse adenopathy palpable throughout bilateral anterior neck, tender to palpation throughout L>R).     Cardiovascular:   Regular rhythm.      Pulmonary/Chest:   Effort normal.     Psychiatric:   She has a normal mood and affect. Her speech is normal.     Neurological:   She is alert and oriented to person, place, and time.     Skin:   No abrasions, lacerations, lesions, or rashes.     Flexible laryngoscopy deferred at this time due to COVID rule-out status.    Significant Labs:  CBC:   Recent Labs   Lab 03/13/20  1402   WBC 17.62*   RBC 3.96*   HGB 12.0   HCT 37.0      MCV 93   MCH 30.3   MCHC 32.4     BMP:   Recent Labs   Lab 03/13/20  1402   GLU 96      CO2 22*   BUN 8   CREATININE 0.6   CALCIUM 8.5*          Significant Diagnostics:  I have reviewed all pertinent imaging results/findings within the past 24 hours.

## 2020-03-13 NOTE — HPI
31 y/o F presents to the ED with left anterior neck swelling that started two days ago (3/11/19). Patient reports that last Friday and Saturday, she felt like she had a head cold, which appeared to completely resolve on Sunday and Monday. On Tuesday (3/10/19), patient says she woke up with a fever of 100, that worsened throughout the day to a Tmax of 104. She says that she also experienced left neck pain, but no swelling. She noticed the left anterior neck swelling start on Wednesday, two days ago. She presented to urgent care on Thursday, who subsequently sent her to the ED. She had a CT, which was read as inconclusive, and discharged on clindamycin, ketorolac, and ondansetron (hx of nausea with clindamycin). She says that this morning, she felt the swelling was significantly worse, and therefore presented to the ED. She notes she was nearly aphonic this morning, though speaking clearly now, but also states she feels her voice is not at baseline and is somewhat raspy compared to normal. She also complains of trouble/pain with swallowing, which has improved since this morning. Associated symptoms include headache, bilateral ear pain L>R, and myalgias.

## 2020-03-13 NOTE — ED NOTES
Patient swabbed for COVID-19 by myself. Specimen collected per hospital policy. PPE worn. Specimen transported to lab by myself, Marii Ashley RN

## 2020-03-14 PROBLEM — Z20.822 SUSPECTED COVID-19 VIRUS INFECTION: Status: ACTIVE | Noted: 2020-03-14

## 2020-03-14 PROBLEM — J12.9 VIRAL PNEUMONIA: Status: ACTIVE | Noted: 2020-03-14

## 2020-03-14 LAB
ANION GAP SERPL CALC-SCNC: 5 MMOL/L (ref 8–16)
BASOPHILS # BLD AUTO: 0.01 K/UL (ref 0–0.2)
BASOPHILS NFR BLD: 0.1 % (ref 0–1.9)
BUN SERPL-MCNC: 7 MG/DL (ref 6–20)
CALCIUM SERPL-MCNC: 8.4 MG/DL (ref 8.7–10.5)
CHLORIDE SERPL-SCNC: 108 MMOL/L (ref 95–110)
CO2 SERPL-SCNC: 23 MMOL/L (ref 23–29)
CREAT SERPL-MCNC: 0.6 MG/DL (ref 0.5–1.4)
CRP SERPL-MCNC: 198.2 MG/L (ref 0–8.2)
DIFFERENTIAL METHOD: ABNORMAL
EOSINOPHIL # BLD AUTO: 0 K/UL (ref 0–0.5)
EOSINOPHIL NFR BLD: 0 % (ref 0–8)
ERYTHROCYTE [DISTWIDTH] IN BLOOD BY AUTOMATED COUNT: 12.8 % (ref 11.5–14.5)
EST. GFR  (AFRICAN AMERICAN): >60 ML/MIN/1.73 M^2
EST. GFR  (NON AFRICAN AMERICAN): >60 ML/MIN/1.73 M^2
GLUCOSE SERPL-MCNC: 222 MG/DL (ref 70–110)
HCT VFR BLD AUTO: 31.5 % (ref 37–48.5)
HGB BLD-MCNC: 10.2 G/DL (ref 12–16)
IMM GRANULOCYTES # BLD AUTO: 0.07 K/UL (ref 0–0.04)
IMM GRANULOCYTES NFR BLD AUTO: 0.7 % (ref 0–0.5)
LYMPHOCYTES # BLD AUTO: 1.3 K/UL (ref 1–4.8)
LYMPHOCYTES NFR BLD: 13.5 % (ref 18–48)
MCH RBC QN AUTO: 30.4 PG (ref 27–31)
MCHC RBC AUTO-ENTMCNC: 32.4 G/DL (ref 32–36)
MCV RBC AUTO: 94 FL (ref 82–98)
MONOCYTES # BLD AUTO: 0.3 K/UL (ref 0.3–1)
MONOCYTES NFR BLD: 2.9 % (ref 4–15)
NEUTROPHILS # BLD AUTO: 8.1 K/UL (ref 1.8–7.7)
NEUTROPHILS NFR BLD: 82.8 % (ref 38–73)
NRBC BLD-RTO: 0 /100 WBC
PLATELET # BLD AUTO: 222 K/UL (ref 150–350)
PMV BLD AUTO: 11.7 FL (ref 9.2–12.9)
POTASSIUM SERPL-SCNC: 3.8 MMOL/L (ref 3.5–5.1)
PROCALCITONIN SERPL IA-MCNC: 0.06 NG/ML
RBC # BLD AUTO: 3.36 M/UL (ref 4–5.4)
SODIUM SERPL-SCNC: 136 MMOL/L (ref 136–145)
WBC # BLD AUTO: 9.8 K/UL (ref 3.9–12.7)

## 2020-03-14 PROCEDURE — 80048 BASIC METABOLIC PNL TOTAL CA: CPT

## 2020-03-14 PROCEDURE — 25000003 PHARM REV CODE 250: Performed by: HOSPITALIST

## 2020-03-14 PROCEDURE — 63600175 PHARM REV CODE 636 W HCPCS: Performed by: HOSPITALIST

## 2020-03-14 PROCEDURE — 99222 1ST HOSP IP/OBS MODERATE 55: CPT | Mod: ,,, | Performed by: INTERNAL MEDICINE

## 2020-03-14 PROCEDURE — 99222 PR INITIAL HOSPITAL CARE,LEVL II: ICD-10-PCS | Mod: ,,, | Performed by: INTERNAL MEDICINE

## 2020-03-14 PROCEDURE — 86140 C-REACTIVE PROTEIN: CPT

## 2020-03-14 PROCEDURE — 11000001 HC ACUTE MED/SURG PRIVATE ROOM

## 2020-03-14 PROCEDURE — 84145 PROCALCITONIN (PCT): CPT

## 2020-03-14 PROCEDURE — 63600175 PHARM REV CODE 636 W HCPCS: Performed by: INTERNAL MEDICINE

## 2020-03-14 PROCEDURE — 85025 COMPLETE CBC W/AUTO DIFF WBC: CPT

## 2020-03-14 RX ORDER — KETOROLAC TROMETHAMINE 10 MG/1
10 TABLET, FILM COATED ORAL EVERY 6 HOURS PRN
Status: DISPENSED | OUTPATIENT
Start: 2020-03-14 | End: 2020-03-15

## 2020-03-14 RX ADMIN — DEXAMETHASONE SODIUM PHOSPHATE 8 MG: 4 INJECTION, SOLUTION INTRA-ARTICULAR; INTRALESIONAL; INTRAMUSCULAR; INTRAVENOUS; SOFT TISSUE at 10:03

## 2020-03-14 RX ADMIN — ACETAMINOPHEN 650 MG: 325 TABLET ORAL at 01:03

## 2020-03-14 RX ADMIN — ENOXAPARIN SODIUM 40 MG: 100 INJECTION SUBCUTANEOUS at 05:03

## 2020-03-14 RX ADMIN — VANCOMYCIN HYDROCHLORIDE 1000 MG: 1 INJECTION, POWDER, LYOPHILIZED, FOR SOLUTION INTRAVENOUS at 12:03

## 2020-03-14 RX ADMIN — PIPERACILLIN SODIUM AND TAZOBACTAM SODIUM 4.5 G: 4; .5 INJECTION, POWDER, LYOPHILIZED, FOR SOLUTION INTRAVENOUS at 09:03

## 2020-03-14 RX ADMIN — PIPERACILLIN SODIUM AND TAZOBACTAM SODIUM 4.5 G: 4; .5 INJECTION, POWDER, LYOPHILIZED, FOR SOLUTION INTRAVENOUS at 05:03

## 2020-03-14 RX ADMIN — DEXAMETHASONE SODIUM PHOSPHATE 8 MG: 4 INJECTION, SOLUTION INTRA-ARTICULAR; INTRALESIONAL; INTRAMUSCULAR; INTRAVENOUS; SOFT TISSUE at 02:03

## 2020-03-14 RX ADMIN — VANCOMYCIN HYDROCHLORIDE 1500 MG: 1.5 INJECTION, POWDER, LYOPHILIZED, FOR SOLUTION INTRAVENOUS at 01:03

## 2020-03-14 RX ADMIN — ACETAMINOPHEN 650 MG: 325 TABLET ORAL at 10:03

## 2020-03-14 RX ADMIN — DEXAMETHASONE SODIUM PHOSPHATE 8 MG: 4 INJECTION, SOLUTION INTRA-ARTICULAR; INTRALESIONAL; INTRAMUSCULAR; INTRAVENOUS; SOFT TISSUE at 01:03

## 2020-03-14 RX ADMIN — PIPERACILLIN SODIUM AND TAZOBACTAM SODIUM 4.5 G: 4; .5 INJECTION, POWDER, LYOPHILIZED, FOR SOLUTION INTRAVENOUS at 01:03

## 2020-03-14 NOTE — PROGRESS NOTES
Pharmacokinetic Initial Assessment: IV Vancomycin    Assessment/Plan:    Initiate intravenous vancomycin with loading dose of 1500 mg once followed by a maintenance dose of vancomycin 1000mg IV every 12 hours  Desired empiric serum trough concentration is 10 to 15 mcg/mL  Draw vancomycin trough level 30 min prior to fourth dose on 3/15 at approximately 1130  Pharmacy will continue to follow and monitor vancomycin.      Please contact pharmacy at extension [phone] with any questions regarding this assessment.     Thank you for the consult,   Sugey Orourke       Patient brief summary:  Dee Mills is a 29 y.o. female initiated on antimicrobial therapy with IV Vancomycin for treatment of suspected skin & soft tissue infection    Drug Allergies:   Review of patient's allergies indicates:   Allergen Reactions    Clindamycin Nausea And Vomiting       Actual Body Weight:   56.6kg    Renal Function:   Estimated Creatinine Clearance: 124.7 mL/min (based on SCr of 0.6 mg/dL).,     Dialysis Method (if applicable):  N/A    CBC (last 72 hours):  Recent Labs   Lab Result Units 03/12/20 2222 03/13/20  1402   WBC K/uL 20.06* 17.62*   Hemoglobin g/dL 12.0 12.0   Hematocrit % 36.1* 37.0   Platelets K/uL 235 233   Gran% % 74.0* 80.0*   Lymph% % 10.0* 11.7*   Mono% % 16.0* 7.3   Eosinophil% % 0.0 0.0   Basophil% % 0.0 0.2   Differential Method  Manual Automated       Metabolic Panel (last 72 hours):  Recent Labs   Lab Result Units 03/12/20 2222 03/13/20  1402   Sodium mmol/L 133* 136   Potassium mmol/L 3.2* 3.4*   Chloride mmol/L 99 106   CO2 mmol/L 24 22*   Glucose mg/dL 153* 96   BUN, Bld mg/dL 10 8   Creatinine mg/dL 1.0 0.6   Albumin g/dL 3.6 3.0*   Total Bilirubin mg/dL 0.2 0.4   Alkaline Phosphatase U/L 65 85   AST U/L 24 31   ALT U/L 17 20       Drug levels (last 3 results):  No results for input(s): VANCOMYCINRA, VANCOMYCINPE, VANCOMYCINTR in the last 72 hours.    Microbiologic Results:  Microbiology Results (last 7 days)      Procedure Component Value Units Date/Time    Influenza A & B by Molecular [[id number]] Collected:  03/13/20 1402    Order Status:  Completed Specimen:  Nasopharyngeal Swab Updated:  03/13/20 1456     Influenza A, Molecular Negative     Influenza B, Molecular Negative     Flu A & B Source Nasal swab    Respiratory Viral Panel by PCR [facility]; Nasal Swab [[id number]] Collected:  03/13/20 1403    Order Status:  Sent Specimen:  Respiratory Updated:  03/13/20 1410

## 2020-03-14 NOTE — PROGRESS NOTES
[facility]  Otorhinolaryngology-Head & Neck Surgery  Progress Note    Subjective:     Post-Op Info:  * No surgery found *      Hospital Day: 2     Interval History: NAEON. Patient is feeling better this morning.    Medications:  Continuous Infusions:  Scheduled Meds:   dexamethasone  8 mg Intravenous Q8H    enoxaparin  40 mg Subcutaneous Daily    piperacillin-tazobactam (ZOSYN) IVPB  4.5 g Intravenous Q8H    vancomycin (VANCOCIN) IVPB  1,000 mg Intravenous Q12H     PRN Meds:acetaminophen, albuterol-ipratropium, glucose, glucose, ketorolac, melatonin, ondansetron, ondansetron, prochlorperazine, sodium chloride 0.9%, sodium chloride 0.9%     Review of patient's allergies indicates:   Allergen Reactions    Clindamycin Nausea And Vomiting     Objective:     Vital Signs (24h Range):  Temp:  [97 °F (36.1 °C)-100.1 °F (37.8 °C)] 97.2 °F (36.2 °C)  Pulse:  [] 75  Resp:  [16-22] 18  SpO2:  [91 %-100 %] 98 %  BP: (105-136)/(63-83) 107/78       Lines/Drains/Airways     None                 Physical Exam      Significant Labs:  All pertinent labs from the last 24 hours have been reviewed.  WBC wnl today    Significant Diagnostics:  I have reviewed all pertinent imaging results/findings within the past 24 hours.    Assessment/Plan:     * Neck mass  30 y/o F with a left submandibular neck mass present since 3/11/19 and fevers since 3/10/19. CT scan shows diffuse lymphadenopathy with left hypodense rim enhancing collection which may represent left neck abscess versus necrotic/suppurative lymph node or necrotic lesion. WBC 17.6. Febrile. Currently on a course of clindamycin. No respiratory distress. Reported hoarseness compared to baseline. Flexible laryngoscopy deferred at this time due to COVID rule-out status.     Improving on abx and steroids    - defer flexible laryngoscopy for now, airway stable  -cont abx, steroids  -can eat today. Please make NPO at midnight tonight  -COVID test pending  -may  tea tomorrow depending on how she looks        Karel Vickers Jr., MD  Otorhinolaryngology-Head & Neck Surgery  [facility]

## 2020-03-14 NOTE — H&P
"Hospital Medicine  History and Physical  [facility]      Patient Name: Dee Mills  MRN:  [id number]  Hospital Medicine Team: [facility] A Maverick Riley MD  Date of Admission:  3/13/2020     Length of Stay:  LOS: 1 day     Principal Problem:  Viral pneumonia      Chief complaint: Neck pain    HPI    Ms. Mills is a 29-year-old woman with no known medical history who presented with neck pain.    She was in her usual state of health until Friday when she experienced a "head cold," which she characterized by a sore throat, headache, and a dry cough. She took some over the counter medications with improvement in her symptoms to the extent that over the weekend she was able to go to a birthday and go canoeing. She reports "probably drinking a little too much" on Monday, and on Tuesday was feeling terrible, febrile with diffuse myalgias and diarrhea, along with worsening left sided neck swelling and tenderness. Her Tmax at home was 104. She tried both ibuprofen and tylenol with transient relief, went to urgent care on Thursday and was given toradol with improvement. She went to [facility] the day prior to admission and was discharged on toradol + clinda with plans to follow-up with ENT as outpatient, but returned here when the swelling dramatically worsened.    On arrival here she was tachycardic but otherwise hemodynamically stable. She was given antibiotics and steroids in the ED with significant improvement. Due to her recent history of fever and upper respiratory symptoms, she was admitted to my service for COVID rule-out    As an aside, she is fearful of an underlying undiagnosed AI disorder. She has no family history (though estranged from mother's side). The symptoms are absent now, but over the last 10 years she experiences episodes of incapacitating leg pain with a bright red rash. Massage helps the pain, but worsens the rash.    Review of Systems    Constitutional: +fever, chills, " NS  HENT: +sore throat, neck swelling and pain  Eyes: Negative for photophobia, visual disturbance.   Respiratory: Negative for cough, shortness of breath.    Cardiovascular: Negative for chest pain, palpitations, leg swelling.   Gastrointestinal: +diarrhea Negative for abdominal pain, constipation, diarrhea, nausea, vomiting.   Endocrine: Negative for cold intolerance, heat intolerance.   Genitourinary: Negative for dysuria, frequency.   Musculoskeletal: +myalgias Negative for arthralgias.   Skin: Negative for rash, wound, erythema (currently)  Neurological: Negative for dizziness, syncope, weakness, light-headedness.   Psychiatric/Behavioral: Negative for confusion, hallucinations, anxiety    History reviewed. No pertinent past medical history.  History reviewed. No pertinent surgical history.     Family History   Problem Relation Age of Onset    Cervical cancer Neg Hx     Endometrial cancer Neg Hx     Vaginal cancer Neg Hx     Ovarian cancer Neg Hx      Social History     Socioeconomic History    Marital status: Single     Spouse name: Not on file    Number of children: Not on file    Years of education: Not on file    Highest education level: Not on file   Occupational History    Not on file   Social Needs    Financial resource strain: Not on file    Food insecurity:     Worry: Not on file     Inability: Not on file    Transportation needs:     Medical: Not on file     Non-medical: Not on file   Tobacco Use    Smoking status: Never Smoker    Smokeless tobacco: Never Used   Substance and Sexual Activity    Alcohol use: Yes    Drug use: No    Sexual activity: Yes     Partners: Male     Birth control/protection: None   Lifestyle    Physical activity:     Days per week: Not on file     Minutes per session: Not on file    Stress: Not on file   Relationships    Social connections:     Talks on phone: Not on file     Gets together: Not on file     Attends Jain service: Not on file     Active  member of club or organization: Not on file     Attends meetings of clubs or organizations: Not on file     Relationship status: Not on file   Other Topics Concern    Not on file   Social History Narrative    Not on file       Medications  Scheduled Meds:   dexamethasone  8 mg Intravenous Q8H    enoxaparin  40 mg Subcutaneous Daily    piperacillin-tazobactam (ZOSYN) IVPB  4.5 g Intravenous Q8H    vancomycin (VANCOCIN) IVPB  1,000 mg Intravenous Q12H     Continuous Infusions:  PRN Meds:.acetaminophen, albuterol-ipratropium, glucose, glucose, ketorolac, melatonin, ondansetron, ondansetron, prochlorperazine, sodium chloride 0.9%, sodium chloride 0.9%    Allergies  Clindamycin    Physical Examination    Temp:  [97 °F (36.1 °C)-100.1 °F (37.8 °C)]   Pulse:  []   Resp:  [16-22]   BP: (105-136)/(63-83)   SpO2:  [91 %-100 %]     Gen: NAD, conversant  Head: NC, AT  Eyes: PERRLA, EOMI  Throat: MMM, OP clear, left-sided neck swelling, TTP  CV: RRR, no M/R/G, no peripheral edema  Resp: CTAB, no increased work of breathing on room air  GI: Soft, NT, ND, +BS  Ext: MAEW, no c/c/e  Neuro: AAOx3, CN grossly intact, no focal neurologic deficits  Psychiatry: Normal mood, normal affect    Laboratory:    Recent Labs   Lab 03/12/20 2222 03/13/20 1402 03/14/20  0444   WBC 20.06* 17.62* 9.80   HGB 12.0 12.0 10.2*   HCT 36.1* 37.0 31.5*    233 222     Recent Labs   Lab 03/12/20 2222 03/13/20  1402 03/14/20  0444   * 136 136   K 3.2* 3.4* 3.8   CL 99 106 108   CO2 24 22* 23   BUN 10 8 7   CREATININE 1.0 0.6 0.6   * 96 222*   CALCIUM 9.1 8.5* 8.4*     Recent Labs   Lab 03/12/20 2222 03/13/20  1402   ALKPHOS 65 85   ALT 17 20   AST 24 31   ALBUMIN 3.6 3.0*   PROT 9.5* 8.4   BILITOT 0.2 0.4      No results for input(s): POCTGLUCOSE in the last 168 hours.  No results for input(s): CPK, CPKMB, MB, TROPONINI in the last 72 hours.    Recent Labs     03/13/20  0137   LACTATE 0.8        No results for input(s):  POCTGLUCOSE in the last 168 hours.  No results found for: LABA1C, HGBA1C      Microbiology:  Microbiology Results (last 7 days)     Procedure Component Value Units Date/Time    Influenza A & B by Molecular [[id number]] Collected:  03/13/20 1402    Order Status:  Completed Specimen:  Nasopharyngeal Swab Updated:  03/13/20 1456     Influenza A, Molecular Negative     Influenza B, Molecular Negative     Flu A & B Source Nasal swab    Respiratory Viral Panel by PCR [facility]; Nasal Swab [[id number]] Collected:  03/13/20 1403    Order Status:  Sent Specimen:  Respiratory Updated:  03/13/20 1419            Imaging  ECG Results          EKG 12-lead (Final result)  Result time 03/13/20 16:45:11    Final result by Interface, Lab In [facility] (03/13/20 16:45:11)                 Narrative:    Test Reason : R00.0,    Vent. Rate : 102 BPM     Atrial Rate : 102 BPM     P-R Int : 124 ms          QRS Dur : 082 ms      QT Int : 326 ms       P-R-T Axes : 080 091 049 degrees     QTc Int : 424 ms    Age and gender specific analysis  Sinus tachycardia  Right atrial enlargement  Rightward axis  Pulmonary disease pattern  Abnormal ECG  No previous ECGs available  Confirmed by DENY THOMAS MD ([id number]) on 3/13/2020 4:44:58 PM    Referred By: [email or web address]   SELF           Confirmed By:DENY THOMAS MD                                No results found for this or any previous visit.      X-Ray Chest AP Portable  Narrative: EXAMINATION:  XR CHEST AP PORTABLE    CLINICAL HISTORY:  concern for covid 19;    TECHNIQUE:  Single frontal view of the chest was performed.    COMPARISON:  None    FINDINGS:  Single chest view is submitted.  The cardiomediastinal silhouette appears appropriate.  There is appearance suggesting mild basilar interstitial infiltrate there is no confluent infiltrate or consolidation, there is no evidence for significant pleural effusion or pneumothorax.  The osseous structures appear intact.  Impression: Mild interstitial basilar infiltrate  "without confluent infiltrate or consolidation    Electronically signed by: Rupert Cook  Date:    03/14/2020  Time:    05:34          Assessment and Plan:    Active Hospital Problems    Diagnosis  POA    *Neck mass [R22.1]  Yes    Neck infection [L08.9]  Yes      Resolved Hospital Problems   No resolved problems to display.       Person under investigation for COVID-19  Viral pneumonia    - COVID-19 testing sent.  - Infection Control notified  - Isolation:   - Airborne and Droplet Precautions  - N95 masks must be fit tested, wear eye protection  - 20 second hand hygiene   - Limit visitors per hospital policy  - Diagnostics   - CBC shows resolving leukocytosis   - CMP (collected 3/13) largely normal.    - .2   - PCT negative    - d-dimer, ferritin, troponin, LDH deferred   - ECG shows sinus tach though with rightward axis   - rapid Flu negative   - RIP pending   - Legionella deferred   - Blood culture NGTD   - Portable CXR "Mild interstitial basilar infiltrate without confluent infiltrate or consolidation"    - Management:   - Supplemental O2 to maintain SpO2 >92%   - Telemetry & Continuous Pulse Ox   - albuterol INHALER PRN (avoid nebulization of secretions)   - Avoiding BiPAP to avoid aerosolization (including home BiPAP)   - Cautious steroid use (unless septic shock) due to concerns regarding increased viral replication   - fluid sparing resuscitation   - Empiric antibiotics per likely source & patient allergies, currently on broad spectrum due to neck mass concerning for abscess    Neck mass    - Marked improvement  - Etiology unknown, likely abscess vs. necrotic node/lesion  - Continue vanc + pip-tazo, will likely narrow to unasyn tomorrow if cultures negative  - Continue scheduled dexamethasone, also on toradol PRN for pain  - ENT following, contemplating rescan and recommending NPO at MN      Maverick Riley M.D.  Department of Hospital Medicine  [facility]  [phone] " (pager)

## 2020-03-14 NOTE — PLAN OF CARE
Full H&P to follow. Pt. To be admitted to dedicated Hospital Medicine COVID-19 Patients under Investigation.    Briefly, the patient is a 28 yo F with no significant PMHx who presented to urgent care yesterday with neck, pain, fevers, diarrhea, and myalgias. CT that revealed left cervical chain lymphadenopathy with left anterolateral neck swelling and edema.  Hypodense rim enhancing collection is seen which may represent left neck abscess versus necrotic/suppurative lymph node or necrotic lesion.  Infectious and neoplastic processes should be considered. Pt. Discharged on PO clindamycin but did not take prior to representing today with worsening neck pain and swelling. She was significantly tachycardic on arrival with a low grade temp of 100.1.    PUI for COVID-19  - CXR pending  - will order broad spectrum abx given concern for neck abscess worsening despite outpatient treatment with clindamycin  - flu negative, respiratory infection panel pending  - COVID testing sent out by ED  - Blood cx drawn  - continue oxygen   - isolation tray  - telemetry  - contact/droplet isolation  - limit patient interaction with outside visitors    Neck Mass  Concern for Suppurative Lymphadenitis  -Pt. Septic with WBC 17 (although improved from yesterday 20), fever and tachycardia  -Broad spectrum abx as above, f/u cultures  -ENT consutled and defer flexible laryngoscopy for now until after COVID ruled out  -Continue decadron 8 mg Q8 hrs per their rec's

## 2020-03-14 NOTE — ASSESSMENT & PLAN NOTE
30 y/o F with a left submandibular neck mass present since 3/11/19 and fevers since 3/10/19. CT scan shows diffuse lymphadenopathy with left hypodense rim enhancing collection which may represent left neck abscess versus necrotic/suppurative lymph node or necrotic lesion. WBC 17.6. Febrile. Currently on a course of clindamycin. No respiratory distress. Reported hoarseness compared to baseline. Flexible laryngoscopy deferred at this time due to COVID rule-out status.     Improving on abx and steroids    - defer flexible laryngoscopy for now, airway stable  -cont abx, steroids  -can eat today. Please make NPO at midnight tonight  -COVID test pending  -may rescan tomorrow depending on how she looks

## 2020-03-14 NOTE — SUBJECTIVE & OBJECTIVE
Interval History: NAEON. Patient is feeling better this morning.    Medications:  Continuous Infusions:  Scheduled Meds:   dexamethasone  8 mg Intravenous Q8H    enoxaparin  40 mg Subcutaneous Daily    piperacillin-tazobactam (ZOSYN) IVPB  4.5 g Intravenous Q8H    vancomycin (VANCOCIN) IVPB  1,000 mg Intravenous Q12H     PRN Meds:acetaminophen, albuterol-ipratropium, glucose, glucose, ketorolac, melatonin, ondansetron, ondansetron, prochlorperazine, sodium chloride 0.9%, sodium chloride 0.9%     Review of patient's allergies indicates:   Allergen Reactions    Clindamycin Nausea And Vomiting     Objective:     Vital Signs (24h Range):  Temp:  [97 °F (36.1 °C)-100.1 °F (37.8 °C)] 97.2 °F (36.2 °C)  Pulse:  [] 75  Resp:  [16-22] 18  SpO2:  [91 %-100 %] 98 %  BP: (105-136)/(63-83) 107/78       Lines/Drains/Airways     None                 Physical Exam      Significant Labs:  All pertinent labs from the last 24 hours have been reviewed.  WBC wnl today    Significant Diagnostics:  I have reviewed all pertinent imaging results/findings within the past 24 hours.

## 2020-03-15 LAB
ALBUMIN SERPL BCP-MCNC: 2.4 G/DL (ref 3.5–5.2)
ALP SERPL-CCNC: 83 U/L (ref 55–135)
ALT SERPL W/O P-5'-P-CCNC: 21 U/L (ref 10–44)
ANION GAP SERPL CALC-SCNC: 9 MMOL/L (ref 8–16)
AST SERPL-CCNC: 18 U/L (ref 10–40)
BASOPHILS # BLD AUTO: 0.03 K/UL (ref 0–0.2)
BASOPHILS NFR BLD: 0.2 % (ref 0–1.9)
BILIRUB SERPL-MCNC: 0.2 MG/DL (ref 0.1–1)
BUN SERPL-MCNC: 14 MG/DL (ref 6–20)
CALCIUM SERPL-MCNC: 8.9 MG/DL (ref 8.7–10.5)
CHLORIDE SERPL-SCNC: 109 MMOL/L (ref 95–110)
CO2 SERPL-SCNC: 22 MMOL/L (ref 23–29)
CREAT SERPL-MCNC: 0.6 MG/DL (ref 0.5–1.4)
DIFFERENTIAL METHOD: ABNORMAL
EOSINOPHIL # BLD AUTO: 0 K/UL (ref 0–0.5)
EOSINOPHIL NFR BLD: 0 % (ref 0–8)
ERYTHROCYTE [DISTWIDTH] IN BLOOD BY AUTOMATED COUNT: 12.9 % (ref 11.5–14.5)
EST. GFR  (AFRICAN AMERICAN): >60 ML/MIN/1.73 M^2
EST. GFR  (NON AFRICAN AMERICAN): >60 ML/MIN/1.73 M^2
GLUCOSE SERPL-MCNC: 157 MG/DL (ref 70–110)
HCT VFR BLD AUTO: 31.6 % (ref 37–48.5)
HGB BLD-MCNC: 10 G/DL (ref 12–16)
IMM GRANULOCYTES # BLD AUTO: 0.13 K/UL (ref 0–0.04)
IMM GRANULOCYTES NFR BLD AUTO: 0.7 % (ref 0–0.5)
LYMPHOCYTES # BLD AUTO: 2.7 K/UL (ref 1–4.8)
LYMPHOCYTES NFR BLD: 13.9 % (ref 18–48)
MCH RBC QN AUTO: 29.8 PG (ref 27–31)
MCHC RBC AUTO-ENTMCNC: 31.6 G/DL (ref 32–36)
MCV RBC AUTO: 94 FL (ref 82–98)
MONOCYTES # BLD AUTO: 0.7 K/UL (ref 0.3–1)
MONOCYTES NFR BLD: 3.8 % (ref 4–15)
NEUTROPHILS # BLD AUTO: 15.9 K/UL (ref 1.8–7.7)
NEUTROPHILS NFR BLD: 81.4 % (ref 38–73)
NRBC BLD-RTO: 0 /100 WBC
PLATELET # BLD AUTO: 271 K/UL (ref 150–350)
PMV BLD AUTO: 12.1 FL (ref 9.2–12.9)
POTASSIUM SERPL-SCNC: 4.3 MMOL/L (ref 3.5–5.1)
PROT SERPL-MCNC: 7.1 G/DL (ref 6–8.4)
RBC # BLD AUTO: 3.36 M/UL (ref 4–5.4)
SODIUM SERPL-SCNC: 140 MMOL/L (ref 136–145)
VANCOMYCIN SERPL-MCNC: 5.3 UG/ML
WBC # BLD AUTO: 19.47 K/UL (ref 3.9–12.7)

## 2020-03-15 PROCEDURE — 80053 COMPREHEN METABOLIC PANEL: CPT

## 2020-03-15 PROCEDURE — 99232 PR SUBSEQUENT HOSPITAL CARE,LEVL II: ICD-10-PCS | Mod: ,,, | Performed by: INTERNAL MEDICINE

## 2020-03-15 PROCEDURE — 63600175 PHARM REV CODE 636 W HCPCS: Performed by: HOSPITALIST

## 2020-03-15 PROCEDURE — 99232 SBSQ HOSP IP/OBS MODERATE 35: CPT | Mod: ,,, | Performed by: INTERNAL MEDICINE

## 2020-03-15 PROCEDURE — 11000001 HC ACUTE MED/SURG PRIVATE ROOM

## 2020-03-15 PROCEDURE — 99232 SBSQ HOSP IP/OBS MODERATE 35: CPT | Mod: ,,, | Performed by: OTOLARYNGOLOGY

## 2020-03-15 PROCEDURE — 99232 PR SUBSEQUENT HOSPITAL CARE,LEVL II: ICD-10-PCS | Mod: ,,, | Performed by: OTOLARYNGOLOGY

## 2020-03-15 PROCEDURE — 80202 ASSAY OF VANCOMYCIN: CPT

## 2020-03-15 PROCEDURE — 36415 COLL VENOUS BLD VENIPUNCTURE: CPT

## 2020-03-15 PROCEDURE — 63600175 PHARM REV CODE 636 W HCPCS: Performed by: INTERNAL MEDICINE

## 2020-03-15 PROCEDURE — 25000003 PHARM REV CODE 250: Performed by: HOSPITALIST

## 2020-03-15 PROCEDURE — 85025 COMPLETE CBC W/AUTO DIFF WBC: CPT

## 2020-03-15 RX ORDER — VANCOMYCIN HCL IN 5 % DEXTROSE 1G/250ML
1000 PLASTIC BAG, INJECTION (ML) INTRAVENOUS
Status: DISCONTINUED | OUTPATIENT
Start: 2020-03-15 | End: 2020-03-16

## 2020-03-15 RX ORDER — VANCOMYCIN HCL IN 5 % DEXTROSE 1G/250ML
1000 PLASTIC BAG, INJECTION (ML) INTRAVENOUS
Status: DISCONTINUED | OUTPATIENT
Start: 2020-03-15 | End: 2020-03-15

## 2020-03-15 RX ADMIN — Medication 6 MG: at 01:03

## 2020-03-15 RX ADMIN — VANCOMYCIN HYDROCHLORIDE 1000 MG: 1 INJECTION, POWDER, LYOPHILIZED, FOR SOLUTION INTRAVENOUS at 12:03

## 2020-03-15 RX ADMIN — ENOXAPARIN SODIUM 40 MG: 100 INJECTION SUBCUTANEOUS at 05:03

## 2020-03-15 RX ADMIN — ACETAMINOPHEN 650 MG: 325 TABLET ORAL at 06:03

## 2020-03-15 RX ADMIN — DEXAMETHASONE SODIUM PHOSPHATE 8 MG: 4 INJECTION, SOLUTION INTRA-ARTICULAR; INTRALESIONAL; INTRAMUSCULAR; INTRAVENOUS; SOFT TISSUE at 01:03

## 2020-03-15 RX ADMIN — PIPERACILLIN AND TAZOBACTAM 4.5 G: 4; .5 INJECTION, POWDER, FOR SOLUTION INTRAVENOUS at 06:03

## 2020-03-15 RX ADMIN — ACETAMINOPHEN 650 MG: 325 TABLET ORAL at 07:03

## 2020-03-15 RX ADMIN — PIPERACILLIN SODIUM AND TAZOBACTAM SODIUM 4.5 G: 4; .5 INJECTION, POWDER, LYOPHILIZED, FOR SOLUTION INTRAVENOUS at 01:03

## 2020-03-15 RX ADMIN — PIPERACILLIN SODIUM AND TAZOBACTAM SODIUM 4.5 G: 4; .5 INJECTION, POWDER, LYOPHILIZED, FOR SOLUTION INTRAVENOUS at 09:03

## 2020-03-15 RX ADMIN — VANCOMYCIN HYDROCHLORIDE 1000 MG: 1 INJECTION, POWDER, LYOPHILIZED, FOR SOLUTION INTRAVENOUS at 03:03

## 2020-03-15 RX ADMIN — DEXAMETHASONE SODIUM PHOSPHATE 8 MG: 4 INJECTION, SOLUTION INTRA-ARTICULAR; INTRALESIONAL; INTRAMUSCULAR; INTRAVENOUS; SOFT TISSUE at 11:03

## 2020-03-15 RX ADMIN — DEXAMETHASONE SODIUM PHOSPHATE 8 MG: 4 INJECTION, SOLUTION INTRA-ARTICULAR; INTRALESIONAL; INTRAMUSCULAR; INTRAVENOUS; SOFT TISSUE at 06:03

## 2020-03-15 NOTE — PROGRESS NOTES
Pharmacokinetic Assessment Follow Up: IV Vancomycin    Vancomycin serum concentration assessment(s):  - Tr resulted subtherapeutic at 5.3 mcg/mL on Vanc 1000 mg Q12H regimen    Vancomycin Regimen Plan:  - Changing to Vanc 1000 mg Q8H regimen   - Next vancomycin trough due prior to 4th dose of new regimen on 3/16 @ 1430    Drug levels (last 3 results):  Recent Labs   Lab Result Units 03/15/20  1209   Vancomycin, Random ug/mL 5.3       Pharmacy will continue to follow and monitor vancomycin.    Please contact pharmacy at extension [phone] for questions regarding this assessment.    Thank you for the consult,   Precious Ashley       Patient brief summary:  Dee Mills is a 29 y.o. female initiated on antimicrobial therapy with IV Vancomycin for treatment of skin & soft tissue infection      Drug Allergies:   Review of patient's allergies indicates:   Allergen Reactions    Clindamycin Nausea And Vomiting       Actual Body Weight:   56.6 kg    Renal Function:   Estimated Creatinine Clearance: 124.7 mL/min (based on SCr of 0.6 mg/dL).,     CBC (last 72 hours):  Recent Labs   Lab Result Units 03/12/20 2222 03/13/20 1402 03/14/20 0444 03/15/20  0628   WBC K/uL 20.06* 17.62* 9.80 19.47*   Hemoglobin g/dL 12.0 12.0 10.2* 10.0*   Hematocrit % 36.1* 37.0 31.5* 31.6*   Platelets K/uL 235 233 222 271   Gran% % 74.0* 80.0* 82.8* 81.4*   Lymph% % 10.0* 11.7* 13.5* 13.9*   Mono% % 16.0* 7.3 2.9* 3.8*   Eosinophil% % 0.0 0.0 0.0 0.0   Basophil% % 0.0 0.2 0.1 0.2   Differential Method  Manual Automated Automated Automated       Metabolic Panel (last 72 hours):  Recent Labs   Lab Result Units 03/12/20 2222 03/13/20  1402 03/14/20  0444 03/15/20  0628   Sodium mmol/L 133* 136 136 140   Potassium mmol/L 3.2* 3.4* 3.8 4.3   Chloride mmol/L 99 106 108 109   CO2 mmol/L 24 22* 23 22*   Glucose mg/dL 153* 96 222* 157*   BUN, Bld mg/dL 10 8 7 14   Creatinine mg/dL 1.0 0.6 0.6 0.6   Albumin g/dL 3.6 3.0*  --  2.4*   Total Bilirubin mg/dL  0.2 0.4  --  0.2   Alkaline Phosphatase U/L 65 85  --  83   AST U/L 24 31  --  18   ALT U/L 17 20  --  21       Vancomycin Administrations:  vancomycin given in the last 96 hours                   vancomycin in dextrose 5 % 1 gram/250 mL IVPB 1,000 mg (mg) 1,000 mg New Bag 03/15/20 0018     1,000 mg New Bag 03/14/20 1258    vancomycin 1.5 g in dextrose 5 % 250 mL IVPB (ready to mix) (mg) 1,500 mg New Bag 03/14/20 0100                Microbiologic Results:  Microbiology Results (last 7 days)     Procedure Component Value Units Date/Time    Influenza A & B by Molecular [[id number]] Collected:  03/13/20 1402    Order Status:  Completed Specimen:  Nasopharyngeal Swab Updated:  03/13/20 1456     Influenza A, Molecular Negative     Influenza B, Molecular Negative     Flu A & B Source Nasal swab    Respiratory Viral Panel by PCR [facility]; Nasal Swab [[id number]] Collected:  03/13/20 1403    Order Status:  Sent Specimen:  Respiratory Updated:  03/13/20 7829

## 2020-03-15 NOTE — PROGRESS NOTES
[facility]  Otorhinolaryngology-Head & Neck Surgery  Progress Note    Subjective:     Post-Op Info:  * No surgery found *      Hospital Day: 3     Interval History: NAEON. Patient continues to symptomatically improve.    Medications:  Continuous Infusions:  Scheduled Meds:   dexamethasone  8 mg Intravenous Q8H    enoxaparin  40 mg Subcutaneous Daily    piperacillin-tazobactam (ZOSYN) IVPB  4.5 g Intravenous Q8H    vancomycin (VANCOCIN) IVPB  1,000 mg Intravenous Q12H     PRN Meds:acetaminophen, albuterol-ipratropium, glucose, glucose, ketorolac, melatonin, ondansetron, ondansetron, prochlorperazine, sodium chloride 0.9%, sodium chloride 0.9%     Review of patient's allergies indicates:   Allergen Reactions    Clindamycin Nausea And Vomiting     Objective:     Vital Signs (24h Range):  Temp:  [96.8 °F (36 °C)-98.2 °F (36.8 °C)] 98.2 °F (36.8 °C)  Pulse:  [61-98] 73  Resp:  [12-18] 12  SpO2:  [95 %-98 %] 96 %  BP: (100-114)/(63-84) 112/84       Lines/Drains/Airways     Peripheral Intravenous Line                 Peripheral IV - Single Lumen 03/13/20 20 G Left Antecubital 2 days                Physical Exam        Significant Labs:  All pertinent labs from the last 24 hours have been reviewed.  leukocytosis    Significant Diagnostics:  I have reviewed all pertinent imaging results/findings within the past 24 hours.    Assessment/Plan:     Neck mass  30 y/o F with a left submandibular neck mass present since 3/11/19 and fevers since 3/10/19. CT scan shows diffuse lymphadenopathy with left hypodense rim enhancing collection which may represent left neck abscess versus necrotic/suppurative lymph node or necrotic lesion. WBC 17.6. Febrile. Currently on a course of clindamycin. No respiratory distress. Reported hoarseness compared to baseline. Flexible laryngoscopy deferred at this time due to COVID rule-out status.     Improving on abx and steroids    -repeat CT neck with contrast today  -cont abx,  steroids  -can eat today. Please make NPO at midnight tonight  -COVID test pending  -will f/u scan        Karel Vickesr Jr., MD  Otorhinolaryngology-Head & Neck Surgery  [facility]

## 2020-03-15 NOTE — SUBJECTIVE & OBJECTIVE
Interval History: NAEON. Patient continues to symptomatically improve.    Medications:  Continuous Infusions:  Scheduled Meds:   dexamethasone  8 mg Intravenous Q8H    enoxaparin  40 mg Subcutaneous Daily    piperacillin-tazobactam (ZOSYN) IVPB  4.5 g Intravenous Q8H    vancomycin (VANCOCIN) IVPB  1,000 mg Intravenous Q12H     PRN Meds:acetaminophen, albuterol-ipratropium, glucose, glucose, ketorolac, melatonin, ondansetron, ondansetron, prochlorperazine, sodium chloride 0.9%, sodium chloride 0.9%     Review of patient's allergies indicates:   Allergen Reactions    Clindamycin Nausea And Vomiting     Objective:     Vital Signs (24h Range):  Temp:  [96.8 °F (36 °C)-98.2 °F (36.8 °C)] 98.2 °F (36.8 °C)  Pulse:  [61-98] 73  Resp:  [12-18] 12  SpO2:  [95 %-98 %] 96 %  BP: (100-114)/(63-84) 112/84       Lines/Drains/Airways     Peripheral Intravenous Line                 Peripheral IV - Single Lumen 03/13/20 20 G Left Antecubital 2 days                Physical Exam        Significant Labs:  All pertinent labs from the last 24 hours have been reviewed.  leukocytosis    Significant Diagnostics:  I have reviewed all pertinent imaging results/findings within the past 24 hours.

## 2020-03-15 NOTE — ASSESSMENT & PLAN NOTE
28 y/o F with a left submandibular neck mass present since 3/11/19 and fevers since 3/10/19. CT scan shows diffuse lymphadenopathy with left hypodense rim enhancing collection which may represent left neck abscess versus necrotic/suppurative lymph node or necrotic lesion. WBC 17.6. Febrile. Currently on a course of clindamycin. No respiratory distress. Reported hoarseness compared to baseline. Flexible laryngoscopy deferred at this time due to COVID rule-out status.     Improving on abx and steroids    -repeat CT neck with contrast today  -cont abx, steroids  -can eat today. Please make NPO at midnight tonight  -COVID test pending  -will f/u scan

## 2020-03-15 NOTE — PROGRESS NOTES
Hospital Medicine  Progress Note  [facility]      Patient Name: Dee Mills  MRN:  [id number]  Hospital Medicine Team: [facility] MED A Maverick Riley MD  Date of Admission:  3/13/2020     Length of Stay:  LOS: 2 days       Principal Problem:  Suspected Covid-19 Virus Infection      Hospital Course:    Ms. Mills is a 29-year-old woman with no known medical history who presented with neck pain and upper respiratory symptoms and was admitted on 3/15 with a neck mass, thought to be abscess vs. necrotic node/lesion complicated by suspected Covid-19 virus infection. She was started on steroids and broad-spectrum antibiotics on admission with marked improvement. ENT is evaluating on a daily basis, currently planning on CT once COVID ruled-out, potentially with operative intervention to follow.    Interval History:       Ms. Mills continues to improve. Her neck swelling is smaller today, and not tender.     She has no new complaints, denying cough and SOB.    ENT continues to follow, CT pending but not likely to be completed until post-COVID r/o    Review of Systems:    Respiratory: No cough or SOB  Cardiovascular: No chest pain, palpitations, or edema    Inpatient Medications:    Current Facility-Administered Medications:     acetaminophen tablet 650 mg, 650 mg, Oral, Q4H PRN, Kavon Choudhary MD, 650 mg at 03/15/20 0725    albuterol-ipratropium 2.5 mg-0.5 mg/3 mL nebulizer solution 3 mL, 3 mL, Nebulization, Q6H PRN, Kavon Choudhary MD    dexamethasone injection 8 mg, 8 mg, Intravenous, Q8H, Kavon Choudhary MD, 8 mg at 03/15/20 0659    enoxaparin injection 40 mg, 40 mg, Subcutaneous, Daily, Kavon Choudhary MD, 40 mg at 03/14/20 1754    glucose chewable tablet 16 g, 16 g, Oral, PRN, Kavon Choudhary MD    glucose chewable tablet 24 g, 24 g, Oral, PRN, Kavon Choudhary MD    ketorolac tablet 10 mg, 10 mg, Oral, Q6H PRN, Maverick Riley MD    melatonin tablet 6 mg, 6 mg, Oral, Nightly PRN,  "Kavon Choudhary MD, 6 mg at 03/15/20 0158    ondansetron disintegrating tablet 4 mg, 4 mg, Oral, Q6H PRN, Jonas Ramirez MD    ondansetron injection 4 mg, 4 mg, Intravenous, Q8H PRN, Kavon Choudhary MD    piperacillin-tazobactam 4.5 g in sodium chloride 0.9% 100 mL IVPB (ready to mix system), 4.5 g, Intravenous, Q8H, Maverick Riley MD    prochlorperazine injection Soln 5 mg, 5 mg, Intravenous, Q6H PRN, Kavon Choudhary MD    sodium chloride 0.9% flush 10 mL, 10 mL, Intravenous, PRN, Jonas Ramirez MD    sodium chloride 0.9% flush 10 mL, 10 mL, Intravenous, PRN, Kavon Choudhary MD    vancomycin in dextrose 5 % 1 gram/250 mL IVPB 1,000 mg, 1,000 mg, Intravenous, Q12H, Maverick Riley MD      Physical Exam:      Intake/Output Summary (Last 24 hours) at 3/15/2020 1145  Last data filed at 3/15/2020 0158  Gross per 24 hour   Intake 350 ml   Output --   Net 350 ml     Wt Readings from Last 3 Encounters:   03/13/20 56.6 kg (124 lb 12.5 oz)   03/12/20 52.6 kg (116 lb)   02/01/19 56.1 kg (123 lb 10.9 oz)       /84   Pulse 73   Temp 98.2 °F (36.8 °C)   Resp 12   Ht 5' 5" (1.651 m)   Wt 56.6 kg (124 lb 12.5 oz)   LMP 02/28/2020 (Exact Date)   SpO2 96%   Breastfeeding? No   BMI 20.76 kg/m²     GEN: NAD, conversant  Resp: CTAB, no wheezes or rales, normal work of breathing   CV: RRR, no m/r/g, no edema  GI: soft, NTND    Laboratory:    Recent Labs   Lab 03/13/20  1402 03/14/20  0444 03/15/20  0628   WBC 17.62* 9.80 19.47*   HGB 12.0 10.2* 10.0*   HCT 37.0 31.5* 31.6*    222 271     Recent Labs   Lab 03/13/20  1402 03/14/20  0444 03/15/20  0628    136 140   K 3.4* 3.8 4.3    108 109   CO2 22* 23 22*   BUN 8 7 14   CREATININE 0.6 0.6 0.6   GLU 96 222* 157*   CALCIUM 8.5* 8.4* 8.9     Recent Labs   Lab 03/12/20  2222 03/13/20  1402 03/15/20  0628   ALKPHOS 65 85 83   ALT 17 20 21   AST 24 31 18   ALBUMIN 3.6 3.0* 2.4*   PROT 9.5* 8.4 7.1   BILITOT 0.2 0.4 0.2      No results for " input(s): POCTGLUCOSE in the last 168 hours.  No results for input(s): CPK, CPKMB, MB, TROPONINI in the last 72 hours.    Recent Labs     03/13/20  0137   LACTATE 0.8        No results for input(s): POCTGLUCOSE in the last 168 hours.  No results found for: LABA1C, HGBA1C      Microbiology:  Microbiology Results (last 7 days)     Procedure Component Value Units Date/Time    Influenza A & B by Molecular [[id number]] Collected:  03/13/20 1402    Order Status:  Completed Specimen:  Nasopharyngeal Swab Updated:  03/13/20 1456     Influenza A, Molecular Negative     Influenza B, Molecular Negative     Flu A & B Source Nasal swab    Respiratory Viral Panel by PCR [facility]; Nasal Swab [[id number]] Collected:  03/13/20 1403    Order Status:  Sent Specimen:  Respiratory Updated:  03/13/20 1419            Imaging  ECG Results          EKG 12-lead (Final result)  Result time 03/13/20 16:45:11    Final result by Interface, Lab In [facility] (03/13/20 16:45:11)                 Narrative:    Test Reason : R00.0,    Vent. Rate : 102 BPM     Atrial Rate : 102 BPM     P-R Int : 124 ms          QRS Dur : 082 ms      QT Int : 326 ms       P-R-T Axes : 080 091 049 degrees     QTc Int : 424 ms    Age and gender specific analysis  Sinus tachycardia  Right atrial enlargement  Rightward axis  Pulmonary disease pattern  Abnormal ECG  No previous ECGs available  Confirmed by DENY THOMAS MD ([id number]) on 3/13/2020 4:44:58 PM    Referred By: [email or web address]   SELF           Confirmed By:DENY THOMAS MD                              No results found for this or any previous visit.    X-Ray Chest AP Portable  Narrative: EXAMINATION:  XR CHEST AP PORTABLE    CLINICAL HISTORY:  concern for covid 19;    TECHNIQUE:  Single frontal view of the chest was performed.    COMPARISON:  None    FINDINGS:  Single chest view is submitted.  The cardiomediastinal silhouette appears appropriate.  There is appearance suggesting mild basilar interstitial infiltrate there is no  "confluent infiltrate or consolidation, there is no evidence for significant pleural effusion or pneumothorax.  The osseous structures appear intact.  Impression: Mild interstitial basilar infiltrate without confluent infiltrate or consolidation    Electronically signed by: Rupert Cook  Date:    03/14/2020  Time:    05:34        Assessment and Plan:    Active Hospital Problems    Diagnosis  POA    *Suspected Covid-19 Virus Infection [R68.89]  Yes    Neck mass [R22.1]  Yes    Neck infection [L08.9]  Yes      Resolved Hospital Problems   No resolved problems to display.       Suspected Covid-19 Virus Infection    Person under investigation (PUI) for COVID-19    - COVID-19 testing sent 3/13/2020 at 1522  - Infection Control notified  - Isolation:   - Airborne and Droplet Precautions  - N95 masks must be fit tested, wear eye protection  - 20 second hand hygiene              - Limit visitors per hospital policy   - Consolidating interventions and lab draws    - Diagnostics              - CBC shows recurrent leukocytosis              - CMP (collected 3/13) largely this AM.               - .2              - PCT negative              - d-dimer, ferritin, troponin, LDH deferred              - ECG shows sinus tach though with rightward axis              - rapid Flu negative              - RIP in process - not sure if this is actually happening or not, but since she has been admitted hospital policy has shifted in favor of not collecting              - Legionella deferred              - Blood culture NGTD              - Portable CXR "Mild interstitial basilar infiltrate without confluent infiltrate or consolidation"     - Management:              - Supplemental O2 to maintain SpO2 >92%              - Telemetry & Continuous Pulse Ox              - albuterol INHALER PRN (avoid nebulization of secretions)              - Avoiding BiPAP to avoid aerosolization (including home BiPAP)              - Cautious steroid use " (unless septic shock) due to concerns regarding increased viral replication              - fluid sparing resuscitation              - Empiric antibiotics per likely source & patient allergies, currently on broad spectrum due to neck mass concerning for abscess     Neck mass     - Continued improvement  - Etiology unknown, likely abscess vs. necrotic node/lesion  - Continue vanc + pip-tazo for one additional day. Improvement with subtherapeutic vanc levels highly suggestive of non MRSA organism  - Continue scheduled dexamethasone, also on toradol PRN for pain  - ENT following, ordered CT scan (will be completed once COVID ruled out) and recommending NPO at JOLIE Riley M.D.  Department of Hospital Medicine  [facility]  [phone] (pager)

## 2020-03-16 LAB
ALBUMIN SERPL BCP-MCNC: 2.5 G/DL (ref 3.5–5.2)
ALP SERPL-CCNC: 74 U/L (ref 55–135)
ALT SERPL W/O P-5'-P-CCNC: 22 U/L (ref 10–44)
ANION GAP SERPL CALC-SCNC: 9 MMOL/L (ref 8–16)
AST SERPL-CCNC: 16 U/L (ref 10–40)
BASOPHILS # BLD AUTO: 0.02 K/UL (ref 0–0.2)
BASOPHILS NFR BLD: 0.1 % (ref 0–1.9)
BILIRUB SERPL-MCNC: 0.2 MG/DL (ref 0.1–1)
BUN SERPL-MCNC: 18 MG/DL (ref 6–20)
CALCIUM SERPL-MCNC: 8.4 MG/DL (ref 8.7–10.5)
CHLORIDE SERPL-SCNC: 107 MMOL/L (ref 95–110)
CO2 SERPL-SCNC: 22 MMOL/L (ref 23–29)
CREAT SERPL-MCNC: 0.7 MG/DL (ref 0.5–1.4)
DIFFERENTIAL METHOD: ABNORMAL
EOSINOPHIL # BLD AUTO: 0 K/UL (ref 0–0.5)
EOSINOPHIL NFR BLD: 0 % (ref 0–8)
ERYTHROCYTE [DISTWIDTH] IN BLOOD BY AUTOMATED COUNT: 12.8 % (ref 11.5–14.5)
EST. GFR  (AFRICAN AMERICAN): >60 ML/MIN/1.73 M^2
EST. GFR  (NON AFRICAN AMERICAN): >60 ML/MIN/1.73 M^2
GLUCOSE SERPL-MCNC: 130 MG/DL (ref 70–110)
HCT VFR BLD AUTO: 30.7 % (ref 37–48.5)
HGB BLD-MCNC: 9.9 G/DL (ref 12–16)
IMM GRANULOCYTES # BLD AUTO: 0.24 K/UL (ref 0–0.04)
IMM GRANULOCYTES NFR BLD AUTO: 1.6 % (ref 0–0.5)
LYMPHOCYTES # BLD AUTO: 2.5 K/UL (ref 1–4.8)
LYMPHOCYTES NFR BLD: 16.5 % (ref 18–48)
MCH RBC QN AUTO: 30.5 PG (ref 27–31)
MCHC RBC AUTO-ENTMCNC: 32.2 G/DL (ref 32–36)
MCV RBC AUTO: 95 FL (ref 82–98)
MONOCYTES # BLD AUTO: 0.5 K/UL (ref 0.3–1)
MONOCYTES NFR BLD: 3.5 % (ref 4–15)
NEUTROPHILS # BLD AUTO: 12 K/UL (ref 1.8–7.7)
NEUTROPHILS NFR BLD: 78.3 % (ref 38–73)
NRBC BLD-RTO: 0 /100 WBC
PLATELET # BLD AUTO: 292 K/UL (ref 150–350)
PMV BLD AUTO: 12.2 FL (ref 9.2–12.9)
POTASSIUM SERPL-SCNC: 4 MMOL/L (ref 3.5–5.1)
PROT SERPL-MCNC: 7.1 G/DL (ref 6–8.4)
RBC # BLD AUTO: 3.25 M/UL (ref 4–5.4)
SODIUM SERPL-SCNC: 138 MMOL/L (ref 136–145)
WBC # BLD AUTO: 15.29 K/UL (ref 3.9–12.7)

## 2020-03-16 PROCEDURE — 25000003 PHARM REV CODE 250: Performed by: PHYSICIAN ASSISTANT

## 2020-03-16 PROCEDURE — 99233 PR SUBSEQUENT HOSPITAL CARE,LEVL III: ICD-10-PCS | Mod: ,,, | Performed by: PHYSICIAN ASSISTANT

## 2020-03-16 PROCEDURE — 11000001 HC ACUTE MED/SURG PRIVATE ROOM

## 2020-03-16 PROCEDURE — 94761 N-INVAS EAR/PLS OXIMETRY MLT: CPT

## 2020-03-16 PROCEDURE — 63600175 PHARM REV CODE 636 W HCPCS: Performed by: HOSPITALIST

## 2020-03-16 PROCEDURE — 80053 COMPREHEN METABOLIC PANEL: CPT

## 2020-03-16 PROCEDURE — 99233 SBSQ HOSP IP/OBS HIGH 50: CPT | Mod: ,,, | Performed by: PHYSICIAN ASSISTANT

## 2020-03-16 PROCEDURE — 36415 COLL VENOUS BLD VENIPUNCTURE: CPT

## 2020-03-16 PROCEDURE — 63600175 PHARM REV CODE 636 W HCPCS: Performed by: INTERNAL MEDICINE

## 2020-03-16 PROCEDURE — 85025 COMPLETE CBC W/AUTO DIFF WBC: CPT

## 2020-03-16 PROCEDURE — 25000003 PHARM REV CODE 250: Performed by: HOSPITALIST

## 2020-03-16 PROCEDURE — 63600175 PHARM REV CODE 636 W HCPCS: Performed by: PHYSICIAN ASSISTANT

## 2020-03-16 PROCEDURE — 25000003 PHARM REV CODE 250: Performed by: INTERNAL MEDICINE

## 2020-03-16 RX ORDER — ALBUTEROL SULFATE 90 UG/1
2 AEROSOL, METERED RESPIRATORY (INHALATION) EVERY 6 HOURS PRN
Status: DISCONTINUED | OUTPATIENT
Start: 2020-03-16 | End: 2020-03-19 | Stop reason: HOSPADM

## 2020-03-16 RX ORDER — POLYETHYLENE GLYCOL 3350 17 G/17G
17 POWDER, FOR SOLUTION ORAL 2 TIMES DAILY PRN
Status: DISCONTINUED | OUTPATIENT
Start: 2020-03-16 | End: 2020-03-19 | Stop reason: HOSPADM

## 2020-03-16 RX ORDER — METRONIDAZOLE 500 MG/1
500 TABLET ORAL EVERY 8 HOURS
Status: DISCONTINUED | OUTPATIENT
Start: 2020-03-16 | End: 2020-03-17

## 2020-03-16 RX ADMIN — PIPERACILLIN AND TAZOBACTAM 4.5 G: 4; .5 INJECTION, POWDER, FOR SOLUTION INTRAVENOUS at 11:03

## 2020-03-16 RX ADMIN — Medication 6 MG: at 06:03

## 2020-03-16 RX ADMIN — ENOXAPARIN SODIUM 40 MG: 100 INJECTION SUBCUTANEOUS at 05:03

## 2020-03-16 RX ADMIN — VANCOMYCIN HYDROCHLORIDE 1000 MG: 1 INJECTION, POWDER, LYOPHILIZED, FOR SOLUTION INTRAVENOUS at 09:03

## 2020-03-16 RX ADMIN — POLYETHYLENE GLYCOL 3350 17 G: 17 POWDER, FOR SOLUTION ORAL at 01:03

## 2020-03-16 RX ADMIN — VANCOMYCIN HYDROCHLORIDE 1000 MG: 1 INJECTION, POWDER, LYOPHILIZED, FOR SOLUTION INTRAVENOUS at 12:03

## 2020-03-16 RX ADMIN — DEXAMETHASONE SODIUM PHOSPHATE 8 MG: 4 INJECTION, SOLUTION INTRA-ARTICULAR; INTRALESIONAL; INTRAMUSCULAR; INTRAVENOUS; SOFT TISSUE at 06:03

## 2020-03-16 RX ADMIN — PIPERACILLIN AND TAZOBACTAM 4.5 G: 4; .5 INJECTION, POWDER, FOR SOLUTION INTRAVENOUS at 02:03

## 2020-03-16 RX ADMIN — METRONIDAZOLE 500 MG: 500 TABLET ORAL at 08:03

## 2020-03-16 RX ADMIN — CEFTRIAXONE 2 G: 2 INJECTION, SOLUTION INTRAVENOUS at 05:03

## 2020-03-16 RX ADMIN — METRONIDAZOLE 500 MG: 500 TABLET ORAL at 03:03

## 2020-03-16 RX ADMIN — ACETAMINOPHEN 650 MG: 325 TABLET ORAL at 08:03

## 2020-03-16 RX ADMIN — Medication 6 MG: at 12:03

## 2020-03-16 NOTE — PROGRESS NOTES
Pt has been AAO X4. She continues with swelling to her lower left jaw area down toward her neck. She is ambulatory continent and cooperative IV antibiotic treatment continues. VVS afebrile negative for cough states she is feeling much better.

## 2020-03-16 NOTE — PROGRESS NOTES
Hospital Medicine  Progress Note  [facility]      Patient Name: Dee Mills  MRN:  [id number]  Hospital Medicine Team: [facility] MED G Tyrel Mobley PA-C  Date of Admission:  3/13/2020     Length of Stay:  LOS: 3 days       Principal Problem:  Suspected Covid-19 Virus Infection      Hospital Course:    Ms. Mills is a 29-year-old woman with no known medical history who presented with neck pain and upper respiratory symptoms and was admitted on 3/15 with a neck mass, thought to be abscess vs. necrotic node/lesion complicated by suspected Covid-19 virus infection. She was started on steroids and broad-spectrum antibiotics on admission with marked improvement. ENT is evaluating on a daily basis, currently planning on CT once COVID ruled-out, potentially with operative intervention to follow.    Interval History:       Ms. Mills continues to improve. Her neck swelling is smaller today, and not tender.     She has no new complaints, denying cough and SOB.    ENT continues to follow, CT pending but not likely to be completed until post-COVID r/o.    Review of Systems:    Respiratory: No cough or SOB  Cardiovascular: No chest pain, palpitations, or edema    Inpatient Medications:    Current Facility-Administered Medications:     acetaminophen tablet 650 mg, 650 mg, Oral, Q4H PRN, Kavon Choudhary MD, 650 mg at 03/15/20 1812    albuterol inhaler 2 puff, 2 puff, Inhalation, Q6H PRN, Tyrel Mobley Jr., PA-C    cefTRIAXone (ROCEPHIN) 2 g in dextrose 5 % 50 mL IVPB, 2 g, Intravenous, Q24H, Tyrel Mobley Jr., PA-C, 2 g at 03/16/20 1703    enoxaparin injection 40 mg, 40 mg, Subcutaneous, Daily, Kavon Choudhary MD, 40 mg at 03/16/20 1702    glucose chewable tablet 16 g, 16 g, Oral, PRN, Kavon Choudhary MD    glucose chewable tablet 24 g, 24 g, Oral, PRN, Kavon Choudhary MD    melatonin tablet 6 mg, 6 mg, Oral, Nightly PRN, Kavon Choudhary MD, 6 mg at 03/16/20 0007    metroNIDAZOLE  "tablet 500 mg, 500 mg, Oral, Q8H, Tyrel Mobley Jr., PA-C, 500 mg at 03/16/20 1558    ondansetron disintegrating tablet 4 mg, 4 mg, Oral, Q6H PRN, Jonas Ramirez MD    polyethylene glycol packet 17 g, 17 g, Oral, BID PRN, Matias Mckeon MD, 17 g at 03/16/20 1332    prochlorperazine injection Soln 5 mg, 5 mg, Intravenous, Q6H PRN, Kavon Chuodhary MD    sodium chloride 0.9% flush 10 mL, 10 mL, Intravenous, PRN, Jonas Ramirez MD    sodium chloride 0.9% flush 10 mL, 10 mL, Intravenous, PRN, Kavon Choudhary MD      Physical Exam:    No intake or output data in the 24 hours ending 03/16/20 1743  Wt Readings from Last 3 Encounters:   03/13/20 56.6 kg (124 lb 12.5 oz)   03/12/20 52.6 kg (116 lb)   02/01/19 56.1 kg (123 lb 10.9 oz)       /84 (BP Location: Right arm, Patient Position: Lying)   Pulse (!) 56   Temp 98.7 °F (37.1 °C) (Oral)   Resp 12   Ht 5' 5" (1.651 m)   Wt 56.6 kg (124 lb 12.5 oz)   LMP 02/28/2020 (Exact Date)   SpO2 97%   Breastfeeding? No   BMI 20.76 kg/m²     GEN: NAD, conversant  Resp: CTAB, no wheezes or rales, normal work of breathing   CV: RRR, no m/r/g, no edema  GI: soft, NTND    Laboratory:    Recent Labs   Lab 03/14/20  0444 03/15/20  0628 03/16/20  0420   WBC 9.80 19.47* 15.29*   HGB 10.2* 10.0* 9.9*   HCT 31.5* 31.6* 30.7*    271 292     Recent Labs   Lab 03/14/20  0444 03/15/20  0628 03/16/20  0420    140 138   K 3.8 4.3 4.0    109 107   CO2 23 22* 22*   BUN 7 14 18   CREATININE 0.6 0.6 0.7   * 157* 130*   CALCIUM 8.4* 8.9 8.4*     Recent Labs   Lab 03/13/20  1402 03/15/20  0628 03/16/20  0420   ALKPHOS 85 83 74   ALT 20 21 22   AST 31 18 16   ALBUMIN 3.0* 2.4* 2.5*   PROT 8.4 7.1 7.1   BILITOT 0.4 0.2 0.2      No results for input(s): POCTGLUCOSE in the last 168 hours.  No results for input(s): CPK, CPKMB, MB, TROPONINI in the last 72 hours.    No results for input(s): LACTATE in the last 72 hours.     No results for input(s): POCTGLUCOSE " in the last 168 hours.  No results found for: LABA1C, HGBA1C      Microbiology:  Microbiology Results (last 7 days)     Procedure Component Value Units Date/Time    Influenza A & B by Molecular [[id number]] Collected:  03/13/20 1402    Order Status:  Completed Specimen:  Nasopharyngeal Swab Updated:  03/13/20 1456     Influenza A, Molecular Negative     Influenza B, Molecular Negative     Flu A & B Source Nasal swab    Respiratory Viral Panel by PCR [facility]; Nasal Swab [[id number]] Collected:  03/13/20 1403    Order Status:  Sent Specimen:  Respiratory Updated:  03/13/20 1419            Imaging  ECG Results          EKG 12-lead (Final result)  Result time 03/13/20 16:45:11    Final result by Interface, Lab In [facility] (03/13/20 16:45:11)                 Narrative:    Test Reason : R00.0,    Vent. Rate : 102 BPM     Atrial Rate : 102 BPM     P-R Int : 124 ms          QRS Dur : 082 ms      QT Int : 326 ms       P-R-T Axes : 080 091 049 degrees     QTc Int : 424 ms    Age and gender specific analysis  Sinus tachycardia  Right atrial enlargement  Rightward axis  Pulmonary disease pattern  Abnormal ECG  No previous ECGs available  Confirmed by DENY THOMAS MD ([id number]) on 3/13/2020 4:44:58 PM    Referred By: [email or web address]   SELF           Confirmed By:DENY THOMAS MD                              No results found for this or any previous visit.    X-Ray Chest AP Portable  Narrative: EXAMINATION:  XR CHEST AP PORTABLE    CLINICAL HISTORY:  concern for covid 19;    TECHNIQUE:  Single frontal view of the chest was performed.    COMPARISON:  None    FINDINGS:  Single chest view is submitted.  The cardiomediastinal silhouette appears appropriate.  There is appearance suggesting mild basilar interstitial infiltrate there is no confluent infiltrate or consolidation, there is no evidence for significant pleural effusion or pneumothorax.  The osseous structures appear intact.  Impression: Mild interstitial basilar infiltrate without  "confluent infiltrate or consolidation    Electronically signed by: Rupert Cook  Date:    03/14/2020  Time:    05:34        Assessment and Plan:    Active Hospital Problems    Diagnosis  POA    *Suspected Covid-19 Virus Infection [R68.89]  Yes    Neck mass [R22.1]  Yes    Neck infection [L08.9]  Yes      Resolved Hospital Problems   No resolved problems to display.       Suspected Covid-19 Virus Infection    Person under investigation (PUI) for COVID-19    - COVID-19 testing sent 3/13/2020 at 1522  - Infection Control notified  - Isolation:   - Airborne and Droplet Precautions  - N95 masks must be fit tested, wear eye protection  - 20 second hand hygiene              - Limit visitors per hospital policy   - Consolidating interventions and lab draws    - Diagnostics              - CBC shows recurrent leukocytosis              - CMP (collected 3/13) largely this AM.               - .2              - PCT negative              - d-dimer, ferritin, troponin, LDH deferred              - ECG shows sinus tach though with rightward axis              - rapid Flu negative              - RIP in process - not sure if this is actually happening or not, but since she has been admitted hospital policy has shifted in favor of not collecting              - Legionella deferred              - Blood culture NGTD              - Portable CXR "Mild interstitial basilar infiltrate without confluent infiltrate or consolidation"     - Management:              - Supplemental O2 to maintain SpO2 >92%              - Telemetry & Continuous Pulse Ox              - albuterol INHALER PRN (avoid nebulization of secretions)              - Avoiding BiPAP to avoid aerosolization (including home BiPAP)              - Cautious steroid use (unless septic shock) due to concerns regarding increased viral replication              - fluid sparing resuscitation              - Empiric antibiotics per likely source & patient allergies, currently on " broad spectrum due to neck mass concerning for abscess     Neck mass     - Continued improvement  - Etiology unknown, likely abscess vs. necrotic node/lesion  - Improvement with subtherapeutic vanc levels highly suggestive of non MRSA organism  - transitioned to Ceftriaxone 2g q24 + Metronidazole 500mg q8  - Continue scheduled dexamethasone, also on toradol PRN for pain  - ENT following, ordered CT scan (will be completed once COVID ruled out)     Tyrel Mobley PA-C  Department of Hospital Medicine  Spectra [phone]

## 2020-03-16 NOTE — PLAN OF CARE
CM attempted to complete a remote assessment d/t isolation restrictions - unable to reach listed contact.     03/16/20 8306   Discharge Assessment   Assessment Type Discharge Planning Assessment

## 2020-03-17 ENCOUNTER — ANESTHESIA EVENT (OUTPATIENT)
Dept: SURGERY | Facility: HOSPITAL | Age: 30
DRG: 572 | End: 2020-03-17
Payer: MEDICAID

## 2020-03-17 ENCOUNTER — ANESTHESIA (OUTPATIENT)
Dept: SURGERY | Facility: HOSPITAL | Age: 30
DRG: 572 | End: 2020-03-17
Payer: MEDICAID

## 2020-03-17 LAB
ALBUMIN SERPL BCP-MCNC: 2.6 G/DL (ref 3.5–5.2)
ALP SERPL-CCNC: 71 U/L (ref 55–135)
ALT SERPL W/O P-5'-P-CCNC: 35 U/L (ref 10–44)
ANION GAP SERPL CALC-SCNC: 9 MMOL/L (ref 8–16)
AST SERPL-CCNC: 26 U/L (ref 10–40)
BASOPHILS # BLD AUTO: 0.04 K/UL (ref 0–0.2)
BASOPHILS NFR BLD: 0.2 % (ref 0–1.9)
BILIRUB SERPL-MCNC: 0.2 MG/DL (ref 0.1–1)
BUN SERPL-MCNC: 24 MG/DL (ref 6–20)
CALCIUM SERPL-MCNC: 8.7 MG/DL (ref 8.7–10.5)
CHLORIDE SERPL-SCNC: 107 MMOL/L (ref 95–110)
CO2 SERPL-SCNC: 24 MMOL/L (ref 23–29)
CREAT SERPL-MCNC: 0.8 MG/DL (ref 0.5–1.4)
DIFFERENTIAL METHOD: ABNORMAL
EOSINOPHIL # BLD AUTO: 0 K/UL (ref 0–0.5)
EOSINOPHIL NFR BLD: 0 % (ref 0–8)
ERYTHROCYTE [DISTWIDTH] IN BLOOD BY AUTOMATED COUNT: 12.6 % (ref 11.5–14.5)
EST. GFR  (AFRICAN AMERICAN): >60 ML/MIN/1.73 M^2
EST. GFR  (NON AFRICAN AMERICAN): >60 ML/MIN/1.73 M^2
GLUCOSE SERPL-MCNC: 72 MG/DL (ref 70–110)
GRAM STN SPEC: NORMAL
GRAM STN SPEC: NORMAL
HCT VFR BLD AUTO: 34 % (ref 37–48.5)
HGB BLD-MCNC: 10.8 G/DL (ref 12–16)
IMM GRANULOCYTES # BLD AUTO: 0.26 K/UL (ref 0–0.04)
IMM GRANULOCYTES NFR BLD AUTO: 1.4 % (ref 0–0.5)
LYMPHOCYTES # BLD AUTO: 5.4 K/UL (ref 1–4.8)
LYMPHOCYTES NFR BLD: 29.1 % (ref 18–48)
MCH RBC QN AUTO: 29.9 PG (ref 27–31)
MCHC RBC AUTO-ENTMCNC: 31.8 G/DL (ref 32–36)
MCV RBC AUTO: 94 FL (ref 82–98)
MONOCYTES # BLD AUTO: 1.5 K/UL (ref 0.3–1)
MONOCYTES NFR BLD: 8 % (ref 4–15)
NEUTROPHILS # BLD AUTO: 11.4 K/UL (ref 1.8–7.7)
NEUTROPHILS NFR BLD: 61.3 % (ref 38–73)
NRBC BLD-RTO: 0 /100 WBC
PLATELET # BLD AUTO: 333 K/UL (ref 150–350)
PLATELET BLD QL SMEAR: ABNORMAL
PMV BLD AUTO: 12 FL (ref 9.2–12.9)
POTASSIUM SERPL-SCNC: 3.7 MMOL/L (ref 3.5–5.1)
PROT SERPL-MCNC: 7.1 G/DL (ref 6–8.4)
RBC # BLD AUTO: 3.61 M/UL (ref 4–5.4)
SODIUM SERPL-SCNC: 140 MMOL/L (ref 136–145)
WBC # BLD AUTO: 18.6 K/UL (ref 3.9–12.7)

## 2020-03-17 PROCEDURE — 88341 IMHCHEM/IMCYTCHM EA ADD ANTB: CPT | Performed by: PATHOLOGY

## 2020-03-17 PROCEDURE — 88365 INSITU HYBRIDIZATION (FISH): CPT | Mod: 26,,, | Performed by: PATHOLOGY

## 2020-03-17 PROCEDURE — 21501 I&D DP ABSC/HMTMA SFT TS NCK: CPT | Mod: 51,,, | Performed by: OTOLARYNGOLOGY

## 2020-03-17 PROCEDURE — 88342 IMHCHEM/IMCYTCHM 1ST ANTB: CPT | Mod: 26,59,, | Performed by: PATHOLOGY

## 2020-03-17 PROCEDURE — 87116 MYCOBACTERIA CULTURE: CPT

## 2020-03-17 PROCEDURE — 88312 PR  SPECIAL STAINS,GROUP I: ICD-10-PCS | Mod: 26,,, | Performed by: PATHOLOGY

## 2020-03-17 PROCEDURE — 87205 SMEAR GRAM STAIN: CPT

## 2020-03-17 PROCEDURE — 25000003 PHARM REV CODE 250: Performed by: PHYSICIAN ASSISTANT

## 2020-03-17 PROCEDURE — 99233 PR SUBSEQUENT HOSPITAL CARE,LEVL III: ICD-10-PCS | Mod: 57,,, | Performed by: OTOLARYNGOLOGY

## 2020-03-17 PROCEDURE — 88341 PR IHC OR ICC EACH ADD'L SINGLE ANTIBODY  STAINPR: ICD-10-PCS | Mod: 26,59,, | Performed by: PATHOLOGY

## 2020-03-17 PROCEDURE — 36415 COLL VENOUS BLD VENIPUNCTURE: CPT

## 2020-03-17 PROCEDURE — 99233 PR SUBSEQUENT HOSPITAL CARE,LEVL III: ICD-10-PCS | Mod: ,,, | Performed by: PHYSICIAN ASSISTANT

## 2020-03-17 PROCEDURE — D9220A PRA ANESTHESIA: Mod: ,,, | Performed by: ANESTHESIOLOGY

## 2020-03-17 PROCEDURE — 25000003 PHARM REV CODE 250: Performed by: HOSPITALIST

## 2020-03-17 PROCEDURE — 36000704 HC OR TIME LEV I 1ST 15 MIN: Performed by: OTOLARYNGOLOGY

## 2020-03-17 PROCEDURE — 36000705 HC OR TIME LEV I EA ADD 15 MIN: Performed by: OTOLARYNGOLOGY

## 2020-03-17 PROCEDURE — 88342 IMHCHEM/IMCYTCHM 1ST ANTB: CPT | Mod: 91 | Performed by: PATHOLOGY

## 2020-03-17 PROCEDURE — 87075 CULTR BACTERIA EXCEPT BLOOD: CPT

## 2020-03-17 PROCEDURE — 88189 FLOWCYTOMETRY/READ 16 & >: CPT | Mod: ,,, | Performed by: PATHOLOGY

## 2020-03-17 PROCEDURE — 88365 PR  TISSUE HYBRIDIZATION: ICD-10-PCS | Mod: 26,,, | Performed by: PATHOLOGY

## 2020-03-17 PROCEDURE — 87206 SMEAR FLUORESCENT/ACID STAI: CPT

## 2020-03-17 PROCEDURE — 87076 CULTURE ANAEROBE IDENT EACH: CPT

## 2020-03-17 PROCEDURE — 88305 TISSUE EXAM BY PATHOLOGIST: CPT | Mod: 26,,, | Performed by: PATHOLOGY

## 2020-03-17 PROCEDURE — 88364 INSITU HYBRIDIZATION (FISH): CPT | Mod: 59 | Performed by: PATHOLOGY

## 2020-03-17 PROCEDURE — 87102 FUNGUS ISOLATION CULTURE: CPT

## 2020-03-17 PROCEDURE — 37000008 HC ANESTHESIA 1ST 15 MINUTES: Performed by: OTOLARYNGOLOGY

## 2020-03-17 PROCEDURE — 88342 CHG IMMUNOCYTOCHEMISTRY: ICD-10-PCS | Mod: 26,59,, | Performed by: PATHOLOGY

## 2020-03-17 PROCEDURE — 81261 IGH GENE REARRANGE AMP METH: CPT | Performed by: PATHOLOGY

## 2020-03-17 PROCEDURE — 88184 FLOWCYTOMETRY/ TC 1 MARKER: CPT | Performed by: PATHOLOGY

## 2020-03-17 PROCEDURE — 88360 PR  TUMOR IMMUNOHISTOCHEM/MANUAL: ICD-10-PCS | Mod: 26,,, | Performed by: PATHOLOGY

## 2020-03-17 PROCEDURE — 38510 BIOPSY/REMOVAL LYMPH NODES: CPT | Mod: LT,,, | Performed by: OTOLARYNGOLOGY

## 2020-03-17 PROCEDURE — 63600175 PHARM REV CODE 636 W HCPCS: Performed by: ANESTHESIOLOGY

## 2020-03-17 PROCEDURE — 88312 SPECIAL STAINS GROUP 1: CPT | Performed by: PATHOLOGY

## 2020-03-17 PROCEDURE — 27201423 OPTIME MED/SURG SUP & DEVICES STERILE SUPPLY: Performed by: OTOLARYNGOLOGY

## 2020-03-17 PROCEDURE — 99233 SBSQ HOSP IP/OBS HIGH 50: CPT | Mod: 57,,, | Performed by: OTOLARYNGOLOGY

## 2020-03-17 PROCEDURE — 25500020 PHARM REV CODE 255: Performed by: INTERNAL MEDICINE

## 2020-03-17 PROCEDURE — 88312 SPECIAL STAINS GROUP 1: CPT | Mod: 26,,, | Performed by: PATHOLOGY

## 2020-03-17 PROCEDURE — 85025 COMPLETE CBC W/AUTO DIFF WBC: CPT

## 2020-03-17 PROCEDURE — 63600175 PHARM REV CODE 636 W HCPCS: Performed by: INTERNAL MEDICINE

## 2020-03-17 PROCEDURE — 63600175 PHARM REV CODE 636 W HCPCS: Performed by: PHYSICIAN ASSISTANT

## 2020-03-17 PROCEDURE — 99233 SBSQ HOSP IP/OBS HIGH 50: CPT | Mod: ,,, | Performed by: PHYSICIAN ASSISTANT

## 2020-03-17 PROCEDURE — 21501 PR I&D DEEP ABSC/HEMATOMA NECK/CHEST: ICD-10-PCS | Mod: 51,,, | Performed by: OTOLARYNGOLOGY

## 2020-03-17 PROCEDURE — 81264 IGK REARRANGEABN CLONAL POP: CPT | Performed by: PATHOLOGY

## 2020-03-17 PROCEDURE — 80053 COMPREHEN METABOLIC PANEL: CPT

## 2020-03-17 PROCEDURE — 87801 DETECT AGNT MULT DNA AMPLI: CPT | Performed by: PATHOLOGY

## 2020-03-17 PROCEDURE — 25000003 PHARM REV CODE 250: Performed by: ANESTHESIOLOGY

## 2020-03-17 PROCEDURE — 87070 CULTURE OTHR SPECIMN AEROBIC: CPT

## 2020-03-17 PROCEDURE — 38510 PR BIOPSY/REM LYMPH NODES, CERVICAL: ICD-10-PCS | Mod: LT,,, | Performed by: OTOLARYNGOLOGY

## 2020-03-17 PROCEDURE — 37000009 HC ANESTHESIA EA ADD 15 MINS: Performed by: OTOLARYNGOLOGY

## 2020-03-17 PROCEDURE — 88305 TISSUE EXAM BY PATHOLOGIST: CPT | Performed by: PATHOLOGY

## 2020-03-17 PROCEDURE — 87015 SPECIMEN INFECT AGNT CONCNTJ: CPT

## 2020-03-17 PROCEDURE — 88341 IMHCHEM/IMCYTCHM EA ADD ANTB: CPT | Mod: 26,59,, | Performed by: PATHOLOGY

## 2020-03-17 PROCEDURE — 11000001 HC ACUTE MED/SURG PRIVATE ROOM

## 2020-03-17 PROCEDURE — 88365 INSITU HYBRIDIZATION (FISH): CPT | Performed by: PATHOLOGY

## 2020-03-17 PROCEDURE — 88189 PR  FLOWCYTOMETRY/READ, 16 & > MARKERS: ICD-10-PCS | Mod: ,,, | Performed by: PATHOLOGY

## 2020-03-17 PROCEDURE — 88342 IMHCHEM/IMCYTCHM 1ST ANTB: CPT | Performed by: PATHOLOGY

## 2020-03-17 PROCEDURE — 88360 TUMOR IMMUNOHISTOCHEM/MANUAL: CPT | Mod: 26,,, | Performed by: PATHOLOGY

## 2020-03-17 PROCEDURE — 88305 TISSUE EXAM BY PATHOLOGIST: ICD-10-PCS | Mod: 26,,, | Performed by: PATHOLOGY

## 2020-03-17 PROCEDURE — D9220A PRA ANESTHESIA: ICD-10-PCS | Mod: ,,, | Performed by: ANESTHESIOLOGY

## 2020-03-17 PROCEDURE — 88185 FLOWCYTOMETRY/TC ADD-ON: CPT | Mod: 59 | Performed by: PATHOLOGY

## 2020-03-17 PROCEDURE — 87147 CULTURE TYPE IMMUNOLOGIC: CPT

## 2020-03-17 RX ORDER — PROPOFOL 10 MG/ML
VIAL (ML) INTRAVENOUS
Status: DISCONTINUED | OUTPATIENT
Start: 2020-03-17 | End: 2020-03-17

## 2020-03-17 RX ORDER — VANCOMYCIN HCL IN 5 % DEXTROSE 1G/250ML
1000 PLASTIC BAG, INJECTION (ML) INTRAVENOUS
Status: DISCONTINUED | OUTPATIENT
Start: 2020-03-17 | End: 2020-03-19 | Stop reason: HOSPADM

## 2020-03-17 RX ORDER — SUCCINYLCHOLINE CHLORIDE 20 MG/ML
INJECTION INTRAMUSCULAR; INTRAVENOUS
Status: DISCONTINUED | OUTPATIENT
Start: 2020-03-17 | End: 2020-03-17

## 2020-03-17 RX ORDER — TRAMADOL HYDROCHLORIDE 50 MG/1
50 TABLET ORAL EVERY 6 HOURS PRN
Status: DISCONTINUED | OUTPATIENT
Start: 2020-03-17 | End: 2020-03-19 | Stop reason: HOSPADM

## 2020-03-17 RX ORDER — ONDANSETRON 2 MG/ML
INJECTION INTRAMUSCULAR; INTRAVENOUS
Status: DISCONTINUED | OUTPATIENT
Start: 2020-03-17 | End: 2020-03-17

## 2020-03-17 RX ORDER — FENTANYL CITRATE 50 UG/ML
INJECTION, SOLUTION INTRAMUSCULAR; INTRAVENOUS
Status: DISCONTINUED | OUTPATIENT
Start: 2020-03-17 | End: 2020-03-17

## 2020-03-17 RX ORDER — KETOROLAC TROMETHAMINE 30 MG/ML
INJECTION, SOLUTION INTRAMUSCULAR; INTRAVENOUS
Status: DISCONTINUED | OUTPATIENT
Start: 2020-03-17 | End: 2020-03-17

## 2020-03-17 RX ORDER — ACETAMINOPHEN 10 MG/ML
INJECTION, SOLUTION INTRAVENOUS
Status: DISCONTINUED | OUTPATIENT
Start: 2020-03-17 | End: 2020-03-17

## 2020-03-17 RX ORDER — HALOPERIDOL 5 MG/ML
INJECTION INTRAMUSCULAR
Status: DISCONTINUED | OUTPATIENT
Start: 2020-03-17 | End: 2020-03-17

## 2020-03-17 RX ORDER — OXYCODONE HYDROCHLORIDE 5 MG/1
5 TABLET ORAL EVERY 6 HOURS PRN
Status: DISCONTINUED | OUTPATIENT
Start: 2020-03-17 | End: 2020-03-17

## 2020-03-17 RX ORDER — LIDOCAINE HYDROCHLORIDE 20 MG/ML
INJECTION INTRAVENOUS
Status: DISCONTINUED | OUTPATIENT
Start: 2020-03-17 | End: 2020-03-17

## 2020-03-17 RX ORDER — HALOPERIDOL 5 MG/ML
INJECTION INTRAMUSCULAR
Status: COMPLETED
Start: 2020-03-17 | End: 2020-03-17

## 2020-03-17 RX ORDER — DEXAMETHASONE SODIUM PHOSPHATE 4 MG/ML
INJECTION, SOLUTION INTRA-ARTICULAR; INTRALESIONAL; INTRAMUSCULAR; INTRAVENOUS; SOFT TISSUE
Status: DISCONTINUED | OUTPATIENT
Start: 2020-03-17 | End: 2020-03-17

## 2020-03-17 RX ADMIN — ACETAMINOPHEN 1000 MG: 10 INJECTION, SOLUTION INTRAVENOUS at 02:03

## 2020-03-17 RX ADMIN — ACETAMINOPHEN 650 MG: 325 TABLET ORAL at 06:03

## 2020-03-17 RX ADMIN — IOHEXOL 75 ML: 350 INJECTION, SOLUTION INTRAVENOUS at 11:03

## 2020-03-17 RX ADMIN — SODIUM CHLORIDE, SODIUM GLUCONATE, SODIUM ACETATE, POTASSIUM CHLORIDE, MAGNESIUM CHLORIDE, SODIUM PHOSPHATE, DIBASIC, AND POTASSIUM PHOSPHATE: .53; .5; .37; .037; .03; .012; .00082 INJECTION, SOLUTION INTRAVENOUS at 12:03

## 2020-03-17 RX ADMIN — LIDOCAINE HYDROCHLORIDE 60 MG: 20 INJECTION, SOLUTION INTRAVENOUS at 12:03

## 2020-03-17 RX ADMIN — PIPERACILLIN AND TAZOBACTAM 4.5 G: 4; .5 INJECTION, POWDER, LYOPHILIZED, FOR SOLUTION INTRAVENOUS; PARENTERAL at 03:03

## 2020-03-17 RX ADMIN — ONDANSETRON 4 MG: 2 INJECTION INTRAMUSCULAR; INTRAVENOUS at 01:03

## 2020-03-17 RX ADMIN — TRAMADOL HYDROCHLORIDE 50 MG: 50 TABLET, FILM COATED ORAL at 09:03

## 2020-03-17 RX ADMIN — FENTANYL CITRATE 50 MCG: 50 INJECTION, SOLUTION INTRAMUSCULAR; INTRAVENOUS at 01:03

## 2020-03-17 RX ADMIN — DEXAMETHASONE SODIUM PHOSPHATE 4 MG: 4 INJECTION, SOLUTION INTRAMUSCULAR; INTRAVENOUS at 01:03

## 2020-03-17 RX ADMIN — PROPOFOL 40 MG: 10 INJECTION, EMULSION INTRAVENOUS at 01:03

## 2020-03-17 RX ADMIN — SUCCINYLCHOLINE CHLORIDE 80 MG: 20 INJECTION, SOLUTION INTRAMUSCULAR; INTRAVENOUS at 12:03

## 2020-03-17 RX ADMIN — FENTANYL CITRATE 100 MCG: 50 INJECTION, SOLUTION INTRAMUSCULAR; INTRAVENOUS at 12:03

## 2020-03-17 RX ADMIN — VANCOMYCIN HYDROCHLORIDE 1000 MG: 1 INJECTION, POWDER, LYOPHILIZED, FOR SOLUTION INTRAVENOUS at 12:03

## 2020-03-17 RX ADMIN — PROPOFOL 160 MG: 10 INJECTION, EMULSION INTRAVENOUS at 12:03

## 2020-03-17 RX ADMIN — VANCOMYCIN HYDROCHLORIDE 1000 MG: 1 INJECTION, POWDER, LYOPHILIZED, FOR SOLUTION INTRAVENOUS at 09:03

## 2020-03-17 RX ADMIN — HALOPERIDOL 1 MG: 5 INJECTION INTRAMUSCULAR at 02:03

## 2020-03-17 RX ADMIN — METRONIDAZOLE 500 MG: 500 TABLET ORAL at 06:03

## 2020-03-17 RX ADMIN — KETOROLAC TROMETHAMINE 15 MG: 30 INJECTION, SOLUTION INTRAMUSCULAR; INTRAVENOUS at 02:03

## 2020-03-17 NOTE — PROGRESS NOTES
Spoke with ENT they said pt still has appointment for Neck CT and could go although results from COVID19 had not come back

## 2020-03-17 NOTE — ANESTHESIA PREPROCEDURE EVALUATION
03/17/2020  Dee Mills is a 29 y.o., female.    Anesthesia Evaluation    I have reviewed the Patient Summary Reports.    I have reviewed the Nursing Notes.   I have reviewed the Medications.     Review of Systems  Anesthesia Hx:  No problems with previous Anesthesia  History of prior surgery of interest to airway management or planning: Denies Family Hx of Anesthesia complications.   Denies Personal Hx of Anesthesia complications.   Hematology/Oncology:  Hematology Normal   Oncology Normal     EENT/Dental:   Neck abscess, no mass effect on airway noted   Cardiovascular:  Cardiovascular Normal Exercise tolerance: good  ECG has been reviewed.    Pulmonary:   Shortness of breath COVID rule out   Renal/:  Renal/ Normal     Hepatic/GI:  Hepatic/GI Normal    Musculoskeletal:  Musculoskeletal Normal    Neurological:  Neurology Normal    Endocrine:  Endocrine Normal    Dermatological:  Skin Normal    Psych:  Psychiatric Normal           Physical Exam  General:  Well nourished    Airway/Jaw/Neck:  Airway Findings: Mouth Opening: Normal Tongue: Normal  General Airway Assessment: Adult  Mallampati: II  TM Distance: Normal, at least 6 cm        Eyes/Ears/Nose:  EYES/EARS/NOSE FINDINGS: Normal   Dental:  DENTAL FINDINGS: Normal   Chest/Lungs:  Chest/Lungs Clear    Heart/Vascular:  Heart Findings: Normal Heart murmur: negative Vascular Findings: Normal    Abdomen:  Abdomen Findings: Normal    Musculoskeletal:  Musculoskeletal Findings: Normal   Skin:  Skin Findings: Normal    Mental Status:  Mental Status Findings: Normal        Anesthesia Plan  Type of Anesthesia, risks & benefits discussed:  Anesthesia Type:  general  Patient's Preference:   Intra-op Monitoring Plan: standard ASA monitors  Intra-op Monitoring Plan Comments:   Post Op Pain Control Plan: multimodal analgesia, IV/PO Opioids PRN and per primary service  following discharge from PACU  Post Op Pain Control Plan Comments:   Induction:   IV and rapid sequence  Beta Blocker:  Patient is not currently on a Beta-Blocker (No further documentation required).       Informed Consent: Patient understands risks and agrees with Anesthesia plan.  Questions answered. Anesthesia consent signed with patient.  ASA Score: 2     Day of Surgery Review of History & Physical:    H&P update referred to the surgeon.     Anesthesia Plan Notes: Verbal informed consent performed with patient in front of operating room nursing staff for corroboration and witness. Patient verbally consented to waiver of written anesthesia consent form.        Ready For Surgery From Anesthesia Perspective.

## 2020-03-17 NOTE — ASSESSMENT & PLAN NOTE
28 y/o F with a left neck mass present since 3/11/19 and fevers since 3/10/19. CT scan shows diffuse lymphadenopathy with left hypodense rim enhancing collection which may represent left neck abscess versus necrotic/suppurative lymph node in the setting of diffuse lymph node proliferation. Steroids and IV abx discontinued yesterday, acute worsening in exam today.  Currently afebrile. White count elevated.    COVID rule-out status - Pending.     -To OR today   Incision and Drainage of Neck Abscess (will also biopsy lymph node for lymphoma workup)   Consented   Added on as Class B surgery  -Repeat CT scan not done due COVID pending status.   -Continue Abx, regimen per primary   Will obtain cultures today. F/u speciation  -NPO

## 2020-03-17 NOTE — PROGRESS NOTES
Pharmacokinetic Initial Assessment: IV Vancomycin    Assessment/Plan:    -Initiate vancomycin 1000 mg (17.7 mg/kg) Q8H for SSTI.  -Trough goal 10-15 mcg/mL.  -ke=0.082 hr-1  -T 1/2 = 8.5 hrs   -Obtain trough before fourth dose on 3/18 @ 1100.     Pharmacy will continue to follow and monitor vancomycin.      Please contact pharmacy at extension [phone] with any questions regarding this assessment.     Thank you for the consult,   Gordy Sigala       Patient brief summary:  Dee Mills is a 29 y.o. female initiated on antimicrobial therapy with IV Vancomycin for treatment of suspected skin & soft tissue infection    Drug Allergies:   Review of patient's allergies indicates:   Allergen Reactions    Clindamycin Nausea And Vomiting       Actual Body Weight:   56.6 kg    Renal Function:   Estimated Creatinine Clearance: 93.5 mL/min (based on SCr of 0.8 mg/dL).,     Dialysis Method (if applicable):  N/A    CBC (last 72 hours):  Recent Labs   Lab Result Units 03/15/20  0628 03/16/20  0420 03/17/20  0523   WBC K/uL 19.47* 15.29* 18.60*   Hemoglobin g/dL 10.0* 9.9* 10.8*   Hematocrit % 31.6* 30.7* 34.0*   Platelets K/uL 271 292 333   Gran% % 81.4* 78.3* 61.3   Lymph% % 13.9* 16.5* 29.1   Mono% % 3.8* 3.5* 8.0   Eosinophil% % 0.0 0.0 0.0   Basophil% % 0.2 0.1 0.2   Differential Method  Automated Automated Automated       Metabolic Panel (last 72 hours):  Recent Labs   Lab Result Units 03/15/20  0628 03/16/20  0420 03/17/20  0523   Sodium mmol/L 140 138 140   Potassium mmol/L 4.3 4.0 3.7   Chloride mmol/L 109 107 107   CO2 mmol/L 22* 22* 24   Glucose mg/dL 157* 130* 72   BUN, Bld mg/dL 14 18 24*   Creatinine mg/dL 0.6 0.7 0.8   Albumin g/dL 2.4* 2.5* 2.6*   Total Bilirubin mg/dL 0.2 0.2 0.2   Alkaline Phosphatase U/L 83 74 71   AST U/L 18 16 26   ALT U/L 21 22 35       Drug levels (last 3 results):  Recent Labs   Lab Result Units 03/15/20  1209   Vancomycin, Random ug/mL 5.3       Microbiologic Results:  Microbiology Results  (last 7 days)     Procedure Component Value Units Date/Time    Influenza A & B by Molecular [[id number]] Collected:  03/13/20 1402    Order Status:  Completed Specimen:  Nasopharyngeal Swab Updated:  03/13/20 1456     Influenza A, Molecular Negative     Influenza B, Molecular Negative     Flu A & B Source Nasal swab    Respiratory Viral Panel by PCR [facility]; Nasal Swab [[id number]] Collected:  03/13/20 1403    Order Status:  Sent Specimen:  Respiratory Updated:  03/13/20 1412

## 2020-03-17 NOTE — BRIEF OP NOTE
[facility]  Brief Operative Note    SUMMARY     Surgery Date: 3/17/2020     Surgeon(s) and Role:     * Tal Valentin MD - Primary     * Jan Lucas MD - Assisting        Pre-op Diagnosis:  Lymphadenopathy [R59.1]  Neck mass [R22.1]  Neck abscess [L02.11]    Post-op Diagnosis:  Post-Op Diagnosis Codes:     * Lymphadenopathy [R59.1]     * Neck mass [R22.1]     * Neck abscess [L02.11]    Procedure(s) (LRB):  INCISION AND DRAINAGE, ABSCESS NECK (Left)  Excisional Lymph Node Biopsy, Left      Anesthesia: General    Description of Procedure: See Op note    Description of the findings of the procedure: See op note    Estimated Blood Loss: Minimum. See nursing recording.          Specimens:   Specimen (12h ago, onward)    None

## 2020-03-17 NOTE — SUBJECTIVE & OBJECTIVE
Interval History: The patient had been improving until yesterday when steroids were stopped and IV abx transitioned to a PO regimen. Reporting interval increase in neck pain.        Medications:  Continuous Infusions:  Scheduled Meds:   enoxaparin  40 mg Subcutaneous Daily    piperacillin-tazobactam (ZOSYN) IVPB  4.5 g Intravenous Q8H    vancomycin (VANCOCIN) IVPB  1,000 mg Intravenous Q8H     PRN Meds:acetaminophen, albuterol, glucose, glucose, melatonin, ondansetron, oxyCODONE, polyethylene glycol, prochlorperazine, sodium chloride 0.9%, sodium chloride 0.9%, Pharmacy to dose Vancomycin consult **AND** vancomycin - pharmacy to dose     Review of patient's allergies indicates:   Allergen Reactions    Clindamycin Nausea And Vomiting     Objective:     Vital Signs (24h Range):  Temp:  [97.1 °F (36.2 °C)-98.7 °F (37.1 °C)] 98.1 °F (36.7 °C)  Pulse:  [51-74] 55  Resp:  [12-18] 18  SpO2:  [97 %] 97 %  BP: (120-136)/(84-92) 130/86       Lines/Drains/Airways     Peripheral Intravenous Line                 Peripheral IV - Single Lumen 03/16/20 0030 22 G Left;Posterior Hand 1 day                Physical Exam    NAD  Anxious and intermittently tearful  Left neck fullness and tenderness  Bilateral diffuse lymphadenopathy.   CN2-12 grossly intact  Moving extremities    Significant Labs:  ABGs: No results for input(s): PH, PCO2, HCO3, POCSATURATED, BE in the last 168 hours.  BMP:   Recent Labs   Lab 03/17/20  0523   GLU 72      CO2 24   BUN 24*   CREATININE 0.8   CALCIUM 8.7     Cardiac Markers: No results for input(s): CKMB, TROPONINT, MYOGLOBIN in the last 168 hours.  CBC:   Recent Labs   Lab 03/17/20  0523   WBC 18.60*   RBC 3.61*   HGB 10.8*   HCT 34.0*      MCV 94   MCH 29.9   MCHC 31.8*     CMP:   Recent Labs   Lab 03/17/20  0523   GLU 72   CALCIUM 8.7   ALBUMIN 2.6*   PROT 7.1      K 3.7   CO2 24      BUN 24*   CREATININE 0.8   ALKPHOS 71   ALT 35   AST 26   BILITOT 0.2     Coagulation: No  results for input(s): LABPROT, INR, APTT in the last 168 hours.  CRP:   Recent Labs   Lab 03/14/20  0444   .2*     ESR: No results for input(s): ERYTHROCYTES in the last 168 hours.  LDH: No results for input(s): LDH in the last 168 hours.  LFTs:   Recent Labs   Lab 03/17/20  0523   ALT 35   AST 26   ALKPHOS 71   BILITOT 0.2   PROT 7.1   ALBUMIN 2.6*       Significant Diagnostics:  None

## 2020-03-17 NOTE — PROGRESS NOTES
Pt remains in isolation for COVID 19 pending results. She is ambulatory ad benja and does spend time ambulating in the room and sitting on the sofa. Swelling to the left neck area remains but seems to have improved since yesterday pt states it feels better she is anxious r/t her outcome. She has video chatted with her significant other today making her feel better. Medications given as ordered including antibiotics.

## 2020-03-17 NOTE — PROGRESS NOTES
Contacted ENT surgery MD , DOS wanted to know if pt needed to have CT done before surgery, MD said it was not necessary but since CT was already at pts room, to go ahead and let them do the scans.

## 2020-03-17 NOTE — PROGRESS NOTES
Hospital Medicine  Progress Note  [facility]      Patient Name: Dee Mills  MRN:  [id number]  Hospital Medicine Team: [facility] MED G Tyrel Mobley PA-C  Date of Admission:  3/13/2020     Length of Stay:  LOS: 4 days       Principal Problem:  Suspected Covid-19 Virus Infection      Hospital Course:    Ms. Mills is a 29-year-old woman with no known medical history who presented with neck pain and upper respiratory symptoms and was admitted on 3/15 with a neck mass, thought to be abscess vs. necrotic node/lesion complicated by suspected Covid-19 virus infection. She was started on steroids and broad-spectrum antibiotics on admission with marked improvement. ENT is evaluating on a daily basis, currently planning on CT once COVID ruled-out, potentially with operative intervention to follow.    Interval History:       Patient's neck pain and swelling progressively worsened overnight. Discussed with ENT and staff, patient to go for I&D today, see plan of care note from Dr. Riley.    Review of Systems:    Respiratory: No cough or SOB  Neck: +Swelling, +tenderness  Cardiovascular: No chest pain, palpitations, or edema    Inpatient Medications:    Current Facility-Administered Medications:     acetaminophen tablet 650 mg, 650 mg, Oral, Q4H PRN, Kavon Choudhary MD, 650 mg at 03/17/20 0605    albuterol inhaler 2 puff, 2 puff, Inhalation, Q6H PRN, Tyrel Mobley Jr., PA-C    glucose chewable tablet 16 g, 16 g, Oral, PRN, Kavon Choudhary MD    glucose chewable tablet 24 g, 24 g, Oral, PRN, Kavon Choudhary MD    melatonin tablet 6 mg, 6 mg, Oral, Nightly PRN, Kavon Choudhary MD, 6 mg at 03/16/20 1832    ondansetron disintegrating tablet 4 mg, 4 mg, Oral, Q6H PRN, Jonas Ramirez MD    oxyCODONE immediate release tablet 5 mg, 5 mg, Oral, Q6H PRN, Stephon Mixon PA-C    piperacillin-tazobactam 4.5 g in sodium chloride 0.9% 100 mL IVPB (ready to mix system), 4.5 g, Intravenous, Q8H,  "Tyrel Mobley Jr., PA-C, Last Rate: 25 mL/hr at 03/17/20 1100, 4.5 g at 03/17/20 1100    polyethylene glycol packet 17 g, 17 g, Oral, BID PRN, Matias Mckeon MD, 17 g at 03/16/20 1332    prochlorperazine injection Soln 5 mg, 5 mg, Intravenous, Q6H PRN, Kavon Choudhary MD    sodium chloride 0.9% flush 10 mL, 10 mL, Intravenous, PRN, Kavon Choudhary MD    Pharmacy to dose Vancomycin consult, , , Once **AND** vancomycin - pharmacy to dose, , Intravenous, pharmacy to manage frequency, Tyrel Mobley Jr., PA-C    vancomycin in dextrose 5 % 1 gram/250 mL IVPB 1,000 mg, 1,000 mg, Intravenous, Q8H, Sherin Rockwell MD, 1,000 mg at 03/17/20 1200    Facility-Administered Medications Ordered in Other Encounters:     dexamethasone injection, , , PRN, Kael Dupont MD, 4 mg at 03/17/20 1305    electrolyte-S (ISOLYTE), , , Continuous PRN, Kael Dupont MD    fentaNYL injection, , , PRN, Kael Dupont MD, 50 mcg at 03/17/20 1329    lidocaine (cardiac) injection, , , PRN, Kael Dupont MD, 60 mg at 03/17/20 1255    ondansetron injection, , , PRN, Kael Dupont MD, 4 mg at 03/17/20 1359    propofol (DIPRIVAN) 10 mg/mL infusion, , , PRN, Kael Dupont MD, 40 mg at 03/17/20 1329    succinylcholine injection, , , PRN, Kael Dupont MD, 80 mg at 03/17/20 1256      Physical Exam:      Intake/Output Summary (Last 24 hours) at 3/17/2020 1413  Last data filed at 3/16/2020 2048  Gross per 24 hour   Intake 500 ml   Output --   Net 500 ml     Wt Readings from Last 3 Encounters:   03/13/20 56.6 kg (124 lb 12.5 oz)   03/12/20 52.6 kg (116 lb)   02/01/19 56.1 kg (123 lb 10.9 oz)       /85   Pulse 76   Temp 98.8 °F (37.1 °C)   Resp 18   Ht 5' 5" (1.651 m)   Wt 56.6 kg (124 lb 12.5 oz)   LMP 02/28/2020 (Exact Date)   SpO2 99%   Breastfeeding? No   BMI 20.76 kg/m²     GEN: NAD, +anxious  Neck: +Left neck swelling, tenderness  Resp: CTAB, no wheezes or rales, normal work " of breathing   CV: RRR, no m/r/g, no edema  GI: soft, NTND    Laboratory:    Recent Labs   Lab 03/15/20  0628 03/16/20  0420 03/17/20  0523   WBC 19.47* 15.29* 18.60*   HGB 10.0* 9.9* 10.8*   HCT 31.6* 30.7* 34.0*    292 333     Recent Labs   Lab 03/15/20  0628 03/16/20  0420 03/17/20  0523    138 140   K 4.3 4.0 3.7    107 107   CO2 22* 22* 24   BUN 14 18 24*   CREATININE 0.6 0.7 0.8   * 130* 72   CALCIUM 8.9 8.4* 8.7     Recent Labs   Lab 03/15/20  0628 03/16/20  0420 03/17/20  0523   ALKPHOS 83 74 71   ALT 21 22 35   AST 18 16 26   ALBUMIN 2.4* 2.5* 2.6*   PROT 7.1 7.1 7.1   BILITOT 0.2 0.2 0.2      No results for input(s): POCTGLUCOSE in the last 168 hours.  No results for input(s): CPK, CPKMB, MB, TROPONINI in the last 72 hours.    No results for input(s): LACTATE in the last 72 hours.     No results for input(s): POCTGLUCOSE in the last 168 hours.  No results found for: LABA1C, HGBA1C      Microbiology:  Microbiology Results (last 7 days)     Procedure Component Value Units Date/Time    Gram stain [[id number]] Collected:  03/17/20 1347    Order Status:  Sent Specimen:  Abscess from Neck Updated:  03/17/20 1347    Aerobic culture [[id number]] Collected:  03/17/20 1347    Order Status:  Sent Specimen:  Abscess from Neck Updated:  03/17/20 1347    Fungus culture [[id number]] Collected:  03/17/20 1347    Order Status:  Sent Specimen:  Abscess from Neck Updated:  03/17/20 1347    Culture, Anaerobe [[id number]] Collected:  03/17/20 1347    Order Status:  Sent Specimen:  Abscess from Neck Updated:  03/17/20 1347    AFB Culture & Smear [[id number]] Collected:  03/17/20 1347    Order Status:  Sent Specimen:  Abscess from Neck Updated:  03/17/20 1347    Influenza A & B by Molecular [[id number]] Collected:  03/13/20 1402    Order Status:  Completed Specimen:  Nasopharyngeal Swab Updated:  03/13/20 1456     Influenza A, Molecular Negative     Influenza B, Molecular Negative     Flu A & B Source Nasal  swab    Respiratory Viral Panel by PCR [facility]; Nasal Swab [[id number]] Collected:  03/13/20 1403    Order Status:  Sent Specimen:  Respiratory Updated:  03/13/20 1419            Imaging  ECG Results          EKG 12-lead (Final result)  Result time 03/13/20 16:45:11    Final result by Interface, Lab In [facility] (03/13/20 16:45:11)                 Narrative:    Test Reason : R00.0,    Vent. Rate : 102 BPM     Atrial Rate : 102 BPM     P-R Int : 124 ms          QRS Dur : 082 ms      QT Int : 326 ms       P-R-T Axes : 080 091 049 degrees     QTc Int : 424 ms    Age and gender specific analysis  Sinus tachycardia  Right atrial enlargement  Rightward axis  Pulmonary disease pattern  Abnormal ECG  No previous ECGs available  Confirmed by DENY THOMAS MD ([id number]) on 3/13/2020 4:44:58 PM    Referred By: TRICE   SELF           Confirmed By:DENY THOMAS MD                              No results found for this or any previous visit.    CT Soft Tissue Neck With Contrast  Narrative: EXAMINATION:  CT SOFT TISSUE NECK WITH CONTRAST    CLINICAL HISTORY:  left neck swelling;    TECHNIQUE:  Axial CT images obtained throughout the region of the neck after the administration of 75 mL Omnipaque 350 intravenous contrast. Axial, sagittal and coronal reconstructions were performed.    COMPARISON:  03/12/2020    FINDINGS:  Multiple asymmetric prominent and enhancing lymph nodes present throughout the left neck.  Most do not appear pathologically enlarged by size criteria, and most are decreased in size from the prior CT.  However, left level 2 lymph node station is and enlarging rim enhancing collection.  This collection appears better organized with a more discrete well-defined wall.  Collection measuring up to 2.7 x 2.1 cm in maximal transverse dimension and over 3.6 cm in craniocaudal dimension.  Surrounding inflammatory stranding insinuating throughout the deep spaces of the neck as well as in extending into the subcutaneous soft  tissues.  Extent of fluid and stranding is not significantly increased from the recent CT.  No new rim enhancing collections identified elsewhere.  No significant mass effect on the airway.    The soft tissues of the nasopharynx, oropharynx, hypopharynx and larynx are within normal limits.    There is heterogeneity with multiple punctate calcifications in fatty atrophy involving the parotid glands bilaterally.  Additional fatty atrophy of the submandibular glands.  Submandibular glands bilaterally.  The thyroid glands are unremarkable.    Major vessels of the neck appear patent. Limited intracranial evaluation is within normal limits.  The visualized paranasal sinuses and mastoid air cells are essentially clear.    Small bilateral pleural effusions.    No osseous lytic or blastic lesions are evident.  Impression: Enlarging rim enhancing collection in the left neck centered at left level 2 lymph node station, thought to suppurative lymph node with abscess formation.  Collection now measuring up to 3.6 cm in size, with more well-defined discrete margins.    Persistent surrounding inflammatory stranding throughout the left neck without significant mass effect on the airway.    Asymmetrically prominent and enhancing lymph nodes elsewhere throughout the left neck, although mostly decreased in size from the recent CT of 03/12/2020.    No new rim enhancing collections identified elsewhere.    Heterogeneity and fatty atrophy of the parotid and submandibular glands bilaterally.  Clinical correlation advised with underlying systemic/autoimmune disorder be considered.    Small bilateral pleural effusions, new from prior.    This report was flagged in Epic as abnormal.    Electronically signed by: Stephon Calabrese MD  Date:    03/17/2020  Time:    12:18        Assessment and Plan:    Active Hospital Problems    Diagnosis  POA    *Suspected Covid-19 Virus Infection [R68.89]  Yes    Neck mass [R22.1]  Yes    Neck infection  "[L08.9]  Yes      Resolved Hospital Problems   No resolved problems to display.       Suspected Covid-19 Virus Infection    Person under investigation (PUI) for COVID-19    - COVID-19 testing sent 3/13/2020 at 1522  - Infection Control notified  - Isolation:   - Airborne and Droplet Precautions  - N95 masks must be fit tested, wear eye protection  - 20 second hand hygiene              - Limit visitors per hospital policy   - Consolidating interventions and lab draws    - Diagnostics              - CBC shows recurrent leukocytosis              - CMP stable               - .2              - PCT negative              - d-dimer, ferritin, troponin, LDH deferred              - ECG shows sinus tach though with rightward axis              - rapid Flu negative              - RIP in process - not sure if this is actually happening or not, but since she has been admitted hospital policy has shifted in favor of not collecting              - Legionella deferred              - Blood culture NGTD              - Portable CXR "Mild interstitial basilar infiltrate without confluent infiltrate or consolidation"     - Management:              - Supplemental O2 to maintain SpO2 >92%              - Telemetry & Continuous Pulse Ox              - albuterol INHALER PRN (avoid nebulization of secretions)              - Avoiding BiPAP to avoid aerosolization (including home BiPAP)              - Cautious steroid use (unless septic shock) due to concerns regarding increased viral replication              - fluid sparing resuscitation              - Empiric antibiotics per likely source & patient allergies, currently on broad spectrum due to neck mass concerning for abscess     Neck mass     - Clinically worsening  - suspect abscess vs. necrotic node/lesion  - re-escalated Abx to Vancomycin + Zosyn  - Dexamethasone , also on toradol PRN for pain  - ENT taking patient to OR today for I&D  - will continue to monitor     Tyrel " MABEL Mobley  Department of [facility] Medicine  Spectra [id number]

## 2020-03-17 NOTE — ANESTHESIA POSTPROCEDURE EVALUATION
Anesthesia Post Evaluation    Patient: Dee Mills    Procedure(s) Performed: Procedure(s) (LRB):  INCISION AND DRAINAGE, ABSCESS NECK (Left)    Final Anesthesia Type: general    Patient location during evaluation: floor  Patient participation: Yes- Able to Participate  Level of consciousness: awake and alert and oriented  Post-procedure vital signs: reviewed and stable  Pain management: adequate  Airway patency: patent    PONV status at discharge: No PONV  Anesthetic complications: no      Cardiovascular status: blood pressure returned to baseline  Respiratory status: unassisted, room air and spontaneous ventilation  Hydration status: euvolemic  Follow-up not needed.          Vitals Value Taken Time   /85 3/17/2020 11:53 AM   Temp 37.1 °C (98.8 °F) 3/17/2020 11:53 AM   Pulse 76 3/17/2020 11:53 AM   Resp 18 3/17/2020 11:53 AM   SpO2 99 % 3/17/2020 11:53 AM         No case tracking events are documented in the log.      Pain/Allie Score: Pain Rating Prior to Med Admin: 5 (3/17/2020  6:05 AM)  Pain Rating Post Med Admin: 2 (3/17/2020  6:55 AM)

## 2020-03-17 NOTE — PROGRESS NOTES
[facility]  Otorhinolaryngology-Head & Neck Surgery  Progress Note    Subjective:     Post-Op Info:  Procedure(s) (LRB):  INCISION AND DRAINAGE, ABSCESS NECK (Left)      Hospital Day: 5     Interval History: The patient had been improving until yesterday when steroids were stopped and IV abx transitioned to a PO regimen. Reporting interval increase in neck pain.        Medications:  Continuous Infusions:  Scheduled Meds:   enoxaparin  40 mg Subcutaneous Daily    piperacillin-tazobactam (ZOSYN) IVPB  4.5 g Intravenous Q8H    vancomycin (VANCOCIN) IVPB  1,000 mg Intravenous Q8H     PRN Meds:acetaminophen, albuterol, glucose, glucose, melatonin, ondansetron, oxyCODONE, polyethylene glycol, prochlorperazine, sodium chloride 0.9%, sodium chloride 0.9%, Pharmacy to dose Vancomycin consult **AND** vancomycin - pharmacy to dose     Review of patient's allergies indicates:   Allergen Reactions    Clindamycin Nausea And Vomiting     Objective:     Vital Signs (24h Range):  Temp:  [97.1 °F (36.2 °C)-98.7 °F (37.1 °C)] 98.1 °F (36.7 °C)  Pulse:  [51-74] 55  Resp:  [12-18] 18  SpO2:  [97 %] 97 %  BP: (120-136)/(84-92) 130/86       Lines/Drains/Airways     Peripheral Intravenous Line                 Peripheral IV - Single Lumen 03/16/20 0030 22 G Left;Posterior Hand 1 day                Physical Exam    NAD  Anxious and intermittently tearful  Left neck fullness and tenderness  Bilateral diffuse lymphadenopathy.   CN2-12 grossly intact  Moving extremities    Significant Labs:  ABGs: No results for input(s): PH, PCO2, HCO3, POCSATURATED, BE in the last 168 hours.  BMP:   Recent Labs   Lab 03/17/20  0523   GLU 72      CO2 24   BUN 24*   CREATININE 0.8   CALCIUM 8.7     Cardiac Markers: No results for input(s): CKMB, TROPONINT, MYOGLOBIN in the last 168 hours.  CBC:   Recent Labs   Lab 03/17/20  0523   WBC 18.60*   RBC 3.61*   HGB 10.8*   HCT 34.0*      MCV 94   MCH 29.9   MCHC 31.8*     CMP:    Recent Labs   Lab 03/17/20  0523   GLU 72   CALCIUM 8.7   ALBUMIN 2.6*   PROT 7.1      K 3.7   CO2 24      BUN 24*   CREATININE 0.8   ALKPHOS 71   ALT 35   AST 26   BILITOT 0.2     Coagulation: No results for input(s): LABPROT, INR, APTT in the last 168 hours.  CRP:   Recent Labs   Lab 03/14/20  0444   .2*     ESR: No results for input(s): ERYTHROCYTES in the last 168 hours.  LDH: No results for input(s): LDH in the last 168 hours.  LFTs:   Recent Labs   Lab 03/17/20  0523   ALT 35   AST 26   ALKPHOS 71   BILITOT 0.2   PROT 7.1   ALBUMIN 2.6*       Significant Diagnostics:  None    Assessment/Plan:     Neck mass  28 y/o F with a left neck mass present since 3/11/19 and fevers since 3/10/19. CT scan shows diffuse lymphadenopathy with left hypodense rim enhancing collection which may represent left neck abscess versus necrotic/suppurative lymph node in the setting of diffuse lymph node proliferation. Steroids and IV abx discontinued yesterday, acute worsening in exam today.  Currently afebrile. White count elevated.    COVID rule-out status - Pending.     -To OR today   Incision and Drainage of Neck Abscess (will also biopsy lymph node for lymphoma workup)   Consented   Added on as Class B surgery  -Repeat CT scan not done due COVID pending status.   -Continue Abx, regimen per primary   Will obtain cultures today. F/u speciation  -NPO          Hilton Sullivan MD  Otorhinolaryngology-Head & Neck Surgery  [facility]

## 2020-03-17 NOTE — PROGRESS NOTES
"Reported to Med Team G that patient is complaining of worsening neck pain. New orders received. Pt refused new PRN pain med that was ordered. Pt opted for Tylenol instead. Also reported to ENT Resident that patient's neck on the left side markedly more swollen than last night. Also reported to ENT resident that patient is adamant about speaking to an ENT Attending Physician. Pt is observed to be highly upset and irritated. Pt states, "I've been here 5 days and no Physician will come in here and talk with me." RN offered comfort and reassurance. Will continue to monitor patient.  "

## 2020-03-17 NOTE — TRANSFER OF CARE
"Anesthesia Transfer of Care Note    Patient: Dee Mills    Procedure(s) Performed: Procedure(s) (LRB):  INCISION AND DRAINAGE, ABSCESS NECK (Left)    Patient location: [facility] Surgical Floor    Anesthesia Type: general    Transport from OR: Transported from OR on room air with adequate spontaneous ventilation    Post pain: adequate analgesia    Post assessment: no apparent anesthetic complications    Post vital signs: stable    Level of consciousness: awake and oriented    Nausea/Vomiting: no nausea/vomiting    Complications: none    Transfer of care protocol was followed      Last vitals:   Visit Vitals  /85   Pulse 76   Temp 37.1 °C (98.8 °F)   Resp 18   Ht 5' 5" (1.651 m)   Wt 56.6 kg (124 lb 12.5 oz)   LMP 02/28/2020 (Exact Date)   SpO2 99%   Breastfeeding? No   BMI 20.76 kg/m²     "

## 2020-03-17 NOTE — ANESTHESIA PROCEDURE NOTES
Intubation  Performed by: Kael Dupont MD  Authorized by: Kael Dupont MD     Intubation:     Induction:  Rapid sequence induction    Intubated:  Postinduction    Mask Ventilation:  N/a (MV intentionally avoided)    Attempts:  1    Attempted By:  Staff anesthesiologist    Method of Intubation:  Direct    Blade:  Leonides 3    Laryngeal View Grade: Grade IIA - cords partially seen      Difficult Airway Encountered?: No      Complications:  None    Airway Device:  Oral endotracheal tube    Airway Device Size:  7.5    Style/Cuff Inflation:  Cuffed    Tube secured:  23    Secured at:  The teeth    Placement Verified By:  Capnometry    Complicating Factors:  Supraglottic mass or abscess    Findings Post-Intubation:  BS equal bilateral and atraumatic/condition of teeth unchanged

## 2020-03-17 NOTE — PLAN OF CARE
Pt AAOx4, VSS, no complaints of pain, free of falls and injuries. Pt NPO status maintained for procedure. Pt had CT of neck done along with an I & D to the L neck abscess. Tele monitoring was D/C per orders. Airborne, Contact & Droplet precautions maintained per orders. See previous notes, will continue to monitor.   Problem: Adult Inpatient Plan of Care  Goal: Plan of Care Review  Outcome: Ongoing, Progressing  Flowsheets (Taken 3/17/2020 1621)  Plan of Care Reviewed With: patient  Goal: Patient-Specific Goal (Individualization)  Outcome: Ongoing, Progressing  Goal: Absence of Hospital-Acquired Illness or Injury  Outcome: Ongoing, Progressing  Intervention: Identify and Manage Fall Risk  Flowsheets (Taken 3/17/2020 1621)  Safety Promotion/Fall Prevention: assistive device/personal item within reach; bed alarm set; diversional activities provided; Fall Risk reviewed with patient/family; Fall Risk signage in place; in recliner, wheels locked; lighting adjusted; medications reviewed; nonskid shoes/socks when out of bed; side rails raised x 2; side rails raised x 3  Intervention: Prevent VTE (venous thromboembolism)  Flowsheets (Taken 3/17/2020 1621)  VTE Prevention/Management: ambulation promoted; bleeding precautions maintained; bleeding risk assessed; bleeding risk factor(s) identified, provider notified; prepared for procedure/surgery  Goal: Optimal Comfort and Wellbeing  Outcome: Ongoing, Progressing  Intervention: Provide Person-Centered Care  Flowsheets (Taken 3/17/2020 1621)  Trust Relationship/Rapport: questions encouraged; care explained; choices provided; emotional support provided; reassurance provided; empathic listening provided; thoughts/feelings acknowledged; questions answered  Goal: Readiness for Transition of Care  Outcome: Ongoing, Progressing  Goal: Rounds/Family Conference  Outcome: Ongoing, Progressing     Problem: Infection  Goal: Infection Symptom Resolution  Outcome: Ongoing,  Progressing  Intervention: Prevent or Manage Infection  Flowsheets (Taken 3/17/2020 5768)  Fever Reduction/Comfort Measures: lightweight bedding; lightweight clothing  Infection Management: aseptic technique maintained; cultures obtained and sent to lab  Isolation Precautions: airborne precautions maintained; protective environment maintained

## 2020-03-17 NOTE — PLAN OF CARE
Discussed patient with Dr. Valentin over the phone. Since her steroids have been stopped, her neck mass has worsened. He is concerned that further delay in her surgery will increase the risk of complications and would like to cancel the CT and proceed with I&D of suspected neck abscess.     Though evidence based pre-test probability is unavailable for COVID19, and atypical cases have been observe, she remains a very atypical case with no current respiratory symptoms and another source of infection.    Maverick Riley M.D.  Department of Hospital Medicine  [facility]  [phone] (pager)

## 2020-03-18 PROBLEM — Z20.822 SUSPECTED COVID-19 VIRUS INFECTION: Status: RESOLVED | Noted: 2020-03-14 | Resolved: 2020-03-18

## 2020-03-18 LAB
ALBUMIN SERPL BCP-MCNC: 2.7 G/DL (ref 3.5–5.2)
ALP SERPL-CCNC: 62 U/L (ref 55–135)
ALT SERPL W/O P-5'-P-CCNC: 29 U/L (ref 10–44)
ANION GAP SERPL CALC-SCNC: 9 MMOL/L (ref 8–16)
AST SERPL-CCNC: 18 U/L (ref 10–40)
BASOPHILS # BLD AUTO: 0.03 K/UL (ref 0–0.2)
BASOPHILS NFR BLD: 0.2 % (ref 0–1.9)
BILIRUB SERPL-MCNC: 0.4 MG/DL (ref 0.1–1)
BUN SERPL-MCNC: 15 MG/DL (ref 6–20)
CALCIUM SERPL-MCNC: 8.4 MG/DL (ref 8.7–10.5)
CHLORIDE SERPL-SCNC: 102 MMOL/L (ref 95–110)
CO2 SERPL-SCNC: 25 MMOL/L (ref 23–29)
CREAT SERPL-MCNC: 0.7 MG/DL (ref 0.5–1.4)
DIFFERENTIAL METHOD: ABNORMAL
EOSINOPHIL # BLD AUTO: 0 K/UL (ref 0–0.5)
EOSINOPHIL NFR BLD: 0.1 % (ref 0–8)
ERYTHROCYTE [DISTWIDTH] IN BLOOD BY AUTOMATED COUNT: 12.5 % (ref 11.5–14.5)
EST. GFR  (AFRICAN AMERICAN): >60 ML/MIN/1.73 M^2
EST. GFR  (NON AFRICAN AMERICAN): >60 ML/MIN/1.73 M^2
GLUCOSE SERPL-MCNC: 87 MG/DL (ref 70–110)
HCT VFR BLD AUTO: 34.8 % (ref 37–48.5)
HGB BLD-MCNC: 11.3 G/DL (ref 12–16)
IMM GRANULOCYTES # BLD AUTO: 0.21 K/UL (ref 0–0.04)
IMM GRANULOCYTES NFR BLD AUTO: 1.7 % (ref 0–0.5)
LYMPHOCYTES # BLD AUTO: 3.5 K/UL (ref 1–4.8)
LYMPHOCYTES NFR BLD: 28.2 % (ref 18–48)
MCH RBC QN AUTO: 30.4 PG (ref 27–31)
MCHC RBC AUTO-ENTMCNC: 32.5 G/DL (ref 32–36)
MCV RBC AUTO: 94 FL (ref 82–98)
MONOCYTES # BLD AUTO: 1.2 K/UL (ref 0.3–1)
MONOCYTES NFR BLD: 9.2 % (ref 4–15)
NEUTROPHILS # BLD AUTO: 7.6 K/UL (ref 1.8–7.7)
NEUTROPHILS NFR BLD: 60.6 % (ref 38–73)
NRBC BLD-RTO: 0 /100 WBC
PLATELET # BLD AUTO: 338 K/UL (ref 150–350)
PMV BLD AUTO: 11.1 FL (ref 9.2–12.9)
POTASSIUM SERPL-SCNC: 3.4 MMOL/L (ref 3.5–5.1)
PROT SERPL-MCNC: 7.3 G/DL (ref 6–8.4)
RBC # BLD AUTO: 3.72 M/UL (ref 4–5.4)
SARS-COV-2 RNA RESP QL NAA+PROBE: NORMAL
SODIUM SERPL-SCNC: 136 MMOL/L (ref 136–145)
VANCOMYCIN TROUGH SERPL-MCNC: 10.1 UG/ML (ref 10–22)
WBC # BLD AUTO: 12.55 K/UL (ref 3.9–12.7)

## 2020-03-18 PROCEDURE — 25000003 PHARM REV CODE 250: Performed by: PHYSICIAN ASSISTANT

## 2020-03-18 PROCEDURE — 25000003 PHARM REV CODE 250: Performed by: HOSPITALIST

## 2020-03-18 PROCEDURE — 85025 COMPLETE CBC W/AUTO DIFF WBC: CPT

## 2020-03-18 PROCEDURE — 63600175 PHARM REV CODE 636 W HCPCS: Performed by: INTERNAL MEDICINE

## 2020-03-18 PROCEDURE — 80202 ASSAY OF VANCOMYCIN: CPT

## 2020-03-18 PROCEDURE — 63600175 PHARM REV CODE 636 W HCPCS: Performed by: PHYSICIAN ASSISTANT

## 2020-03-18 PROCEDURE — 80053 COMPREHEN METABOLIC PANEL: CPT

## 2020-03-18 PROCEDURE — 36415 COLL VENOUS BLD VENIPUNCTURE: CPT

## 2020-03-18 PROCEDURE — 99233 SBSQ HOSP IP/OBS HIGH 50: CPT | Mod: ,,, | Performed by: PHYSICIAN ASSISTANT

## 2020-03-18 PROCEDURE — 94761 N-INVAS EAR/PLS OXIMETRY MLT: CPT

## 2020-03-18 PROCEDURE — 11000001 HC ACUTE MED/SURG PRIVATE ROOM

## 2020-03-18 PROCEDURE — 99233 PR SUBSEQUENT HOSPITAL CARE,LEVL III: ICD-10-PCS | Mod: ,,, | Performed by: PHYSICIAN ASSISTANT

## 2020-03-18 RX ORDER — ACETAMINOPHEN 325 MG/1
650 TABLET ORAL 3 TIMES DAILY
Status: DISCONTINUED | OUTPATIENT
Start: 2020-03-18 | End: 2020-03-19 | Stop reason: HOSPADM

## 2020-03-18 RX ORDER — POTASSIUM CHLORIDE 20 MEQ/1
40 TABLET, EXTENDED RELEASE ORAL ONCE
Status: COMPLETED | OUTPATIENT
Start: 2020-03-18 | End: 2020-03-18

## 2020-03-18 RX ADMIN — ACETAMINOPHEN 650 MG: 325 TABLET ORAL at 03:03

## 2020-03-18 RX ADMIN — Medication 6 MG: at 10:03

## 2020-03-18 RX ADMIN — ACETAMINOPHEN 650 MG: 325 TABLET ORAL at 09:03

## 2020-03-18 RX ADMIN — TRAMADOL HYDROCHLORIDE 50 MG: 50 TABLET, FILM COATED ORAL at 06:03

## 2020-03-18 RX ADMIN — TRAMADOL HYDROCHLORIDE 50 MG: 50 TABLET, FILM COATED ORAL at 11:03

## 2020-03-18 RX ADMIN — TRAMADOL HYDROCHLORIDE 50 MG: 50 TABLET, FILM COATED ORAL at 04:03

## 2020-03-18 RX ADMIN — POTASSIUM CHLORIDE 40 MEQ: 1500 TABLET, EXTENDED RELEASE ORAL at 06:03

## 2020-03-18 RX ADMIN — VANCOMYCIN HYDROCHLORIDE 1000 MG: 1 INJECTION, POWDER, LYOPHILIZED, FOR SOLUTION INTRAVENOUS at 04:03

## 2020-03-18 RX ADMIN — VANCOMYCIN HYDROCHLORIDE 1000 MG: 1 INJECTION, POWDER, LYOPHILIZED, FOR SOLUTION INTRAVENOUS at 09:03

## 2020-03-18 RX ADMIN — PIPERACILLIN AND TAZOBACTAM 4.5 G: 4; .5 INJECTION, POWDER, LYOPHILIZED, FOR SOLUTION INTRAVENOUS; PARENTERAL at 11:03

## 2020-03-18 RX ADMIN — PIPERACILLIN AND TAZOBACTAM 4.5 G: 4; .5 INJECTION, POWDER, LYOPHILIZED, FOR SOLUTION INTRAVENOUS; PARENTERAL at 07:03

## 2020-03-18 RX ADMIN — VANCOMYCIN HYDROCHLORIDE 1000 MG: 1 INJECTION, POWDER, LYOPHILIZED, FOR SOLUTION INTRAVENOUS at 02:03

## 2020-03-18 RX ADMIN — PIPERACILLIN AND TAZOBACTAM 4.5 G: 4; .5 INJECTION, POWDER, LYOPHILIZED, FOR SOLUTION INTRAVENOUS; PARENTERAL at 03:03

## 2020-03-18 NOTE — PROGRESS NOTES
[facility]  Otorhinolaryngology-Head & Neck Surgery  Progress Note     Subjective:      Post-Op Info:  Procedure(s) (LRB):  INCISION AND DRAINAGE, ABSCESS NECK (Left)   1 Day Post-Op  Hospital Day: 6      Patient physically not seen today by resident. Per policy, COVID pending are seen primarily by Staff. Majority of subjective/objective findings are relayed remotely from the patient via nursing. The patient was however seen by Dr. Rai     Interval History: Patient reports pain and movement improved. Gram stain growing GPC.      Medications:  Continuous Infusions:  Scheduled Meds:   piperacillin-tazobactam (ZOSYN) IVPB  4.5 g Intravenous Q8H    vancomycin (VANCOCIN) IVPB  1,000 mg Intravenous Q8H      PRN Meds:acetaminophen, albuterol, glucose, glucose, melatonin, ondansetron, polyethylene glycol, prochlorperazine, sodium chloride 0.9%, traMADoL, Pharmacy to dose Vancomycin consult **AND** vancomycin - pharmacy to dose           Review of patient's allergies indicates:   Allergen Reactions    Clindamycin Nausea And Vomiting      Objective:      Vital Signs (24h Range):  Temp:  [96.4 °F (35.8 °C)-98.1 °F (36.7 °C)] 96.4 °F (35.8 °C)  Pulse:  [69-80] 80  Resp:  [17-18] 18  SpO2:  [95 %-99 %] 99 %  BP: ()/(59-85) 129/85             Lines/Drains/Airways            Peripheral Intravenous Line                          Peripheral IV - Single Lumen 03/17/20 1201 22 G Left Antecubital less than 1 day           Peripheral IV - Single Lumen 03/18/20 1145 20 G Anterior;Left Forearm less than 1 day                     Physical Exam     NAD  Wound with dressing in place. Gauze has not completely saturated through.   Penrose drain removed. Dressing replaced.   Bilateral diffuse lymphadenopathy.   CN2-12 grossly intact  Moving extremities        Significant Labs:  ABGs: No results for input(s): PH, PCO2, HCO3, POCSATURATED, BE in the last 168 hours.  BMP:       Recent Labs   Lab 03/18/20  0502   GLU  87      CO2 25   BUN 15   CREATININE 0.7   CALCIUM 8.4*      Cardiac Markers: No results for input(s): CKMB, TROPONINT, MYOGLOBIN in the last 168 hours.  CBC:       Recent Labs   Lab 03/18/20  0502   WBC 12.55   RBC 3.72*   HGB 11.3*   HCT 34.8*      MCV 94   MCH 30.4   MCHC 32.5      CMP:       Recent Labs   Lab 03/18/20  0502   GLU 87   CALCIUM 8.4*   ALBUMIN 2.7*   PROT 7.3      K 3.4*   CO2 25      BUN 15   CREATININE 0.7   ALKPHOS 62   ALT 29   AST 18   BILITOT 0.4      Coagulation: No results for input(s): LABPROT, INR, APTT in the last 168 hours.  CRP:       Recent Labs   Lab 03/14/20  0444   .2*      ESR: No results for input(s): ERYTHROCYTES in the last 168 hours.  LDH: No results for input(s): LDH in the last 168 hours.  LFTs:       Recent Labs   Lab 03/18/20  0502   ALT 29   AST 18   ALKPHOS 62   BILITOT 0.4   PROT 7.3   ALBUMIN 2.7*      Microbiology Results (last 7 days)      Procedure Component Value Units Date/Time     Culture, Anaerobe [[id number]] Collected:  03/17/20 1347     Order Status:  Completed Specimen:  Abscess from Neck Updated:  03/18/20 0854       Anaerobic Culture Culture in progress     Narrative:        1. Left neck level 2     Aerobic culture [[id number]] Collected:  03/17/20 1347     Order Status:  Completed Specimen:  Abscess from Neck Updated:  03/18/20 0823       Aerobic Bacterial Culture No growth     Narrative:        1. Left neck level 2     Gram stain [[id number]] Collected:  03/17/20 1347     Order Status:  Completed Specimen:  Abscess from Neck Updated:  03/17/20 1723       Gram Stain Result Rare WBC's         Rare Gram positive cocci     Narrative:        1. Left neck level 2     AFB Culture & Smear [[id number]] Collected:  03/17/20 1347     Order Status:  Sent Specimen:  Abscess from Neck Updated:  03/17/20 1417     Fungus culture [[id number]] Collected:  03/17/20 1347     Order Status:  Sent Specimen:  Abscess from Neck Updated:  03/17/20 1416      Influenza A & B by Molecular [[id number]] Collected:  03/13/20 1402     Order Status:  Completed Specimen:  Nasopharyngeal Swab Updated:  03/13/20 1456       Influenza A, Molecular Negative       Influenza B, Molecular Negative       Flu A & B Source Nasal swab     Respiratory Viral Panel by PCR [facility]; Nasal Swab [[id number]] Collected:  03/13/20 1403     Order Status:  Sent Specimen:  Respiratory Updated:  03/13/20 1419             Significant Diagnostics:  None     Assessment/Plan:      Neck mass  28 y/o F with a left neck mass present since 3/11/19 and fevers since 3/10/19. CT scan shows diffuse lymphadenopathy with left hypodense rim enhancing collection which may represent left neck abscess versus necrotic/suppurative lymph node in the setting of diffuse lymph node proliferation. POD1 s/p I&D of left neck abscess and Level 2 Lymphadenectomy. Doing well     COVID rule-out status - Pending.      -F/u cultures and speciation          Pending above, tailor outpatient abx regimen  -F/u path  -Penrose drain removed.  -From ENT/HNS prospective, patient likely would benefit from 1 more day of IV abx. Beyond that, she can likely go with a PO abx regimen. We will see her in 1 week for suture removal with Dr. Valentin  -Remainder of care per primary team.   -Page with questions/concerns              Hilton Sullivan MD  Otorhinolaryngology-Head & Neck Surgery  [facility]

## 2020-03-18 NOTE — ASSESSMENT & PLAN NOTE
28 y/o F with a left neck mass present since 3/11/19 and fevers since 3/10/19. CT scan shows diffuse lymphadenopathy with left hypodense rim enhancing collection which may represent left neck abscess versus necrotic/suppurative lymph node in the setting of diffuse lymph node proliferation. POD1 s/p I&D of left neck abscess and Level 2 Lymphadenectomy. Doing well    COVID rule-out status - Pending.     -F/u cultures and speciation   Pending above, tailor outpatient abx regimen  -F/u path  -Likely can remove penrose prior to discharge  -Remainder of care per primary team.   -Page with questions/concerns

## 2020-03-18 NOTE — PROGRESS NOTES
Hospital Medicine  Progress Note  [facility]      Patient Name: Dee iMlls  MRN:  [id number]  Hospital Medicine Team: [facility] D Tyrel Mobley PA-C  Date of Admission:  3/13/2020     Length of Stay:  LOS: 5 days       Principal Problem:  Suspected Covid-19 Virus Infection      Hospital Course:    Ms. Mills is a 29-year-old woman with no known medical history who presented with neck pain and upper respiratory symptoms and was admitted on 3/15 with a neck mass, thought to be abscess vs. necrotic node/lesion complicated by suspected Covid-19 virus infection. She was started on steroids and broad-spectrum antibiotics on admission with marked improvement. ENT is evaluating on a daily basis, currently planning on CT once COVID ruled-out, potentially with operative intervention to follow.    Interval History:       Patient's neck pain and swelling progressively worsened overnight. Discussed with ENT and staff, patient to go for I&D today, see plan of care note from Dr. Riley.    Review of Systems:    Respiratory: No cough or SOB  Neck: +Swelling, +tenderness  Cardiovascular: No chest pain, palpitations, or edema    Inpatient Medications:    Current Facility-Administered Medications:     acetaminophen tablet 650 mg, 650 mg, Oral, Q4H PRN, Kavon Choudhary MD, 650 mg at 03/17/20 0605    acetaminophen tablet 650 mg, 650 mg, Oral, TID, Tyrel Mobley Jr., PA-C, 650 mg at 03/18/20 1552    albuterol inhaler 2 puff, 2 puff, Inhalation, Q6H PRN, Tyrel Mobley Jr., PA-C    glucose chewable tablet 16 g, 16 g, Oral, PRN, Kavon Choudhary MD    glucose chewable tablet 24 g, 24 g, Oral, PRN, Kavon Choudhary MD    melatonin tablet 6 mg, 6 mg, Oral, Nightly PRN, Kavon Choudhary MD, 6 mg at 03/16/20 1832    ondansetron disintegrating tablet 4 mg, 4 mg, Oral, Q6H PRN, Jonas Ramirez MD    piperacillin-tazobactam 4.5 g in sodium chloride 0.9% 100 mL IVPB (ready to mix system), 4.5 g,  "Intravenous, Q8H, Tyrel Mobley Jr., PA-C, Last Rate: 25 mL/hr at 03/18/20 1100, 4.5 g at 03/18/20 1100    polyethylene glycol packet 17 g, 17 g, Oral, BID PRN, Matias Mckeon MD, 17 g at 03/16/20 1332    potassium chloride SA CR tablet 40 mEq, 40 mEq, Oral, Once, Tyrel Mobley Jr., PA-C    prochlorperazine injection Soln 5 mg, 5 mg, Intravenous, Q6H PRN, Kavon Choudhary MD    sodium chloride 0.9% flush 10 mL, 10 mL, Intravenous, PRN, Kavon Choudhary MD    traMADoL tablet 50 mg, 50 mg, Oral, Q6H PRN, Tyrel Mobley Jr., PA-C, 50 mg at 03/18/20 1111    Pharmacy to dose Vancomycin consult, , , Once **AND** vancomycin - pharmacy to dose, , Intravenous, pharmacy to manage frequency, Tyrel Mobley Jr., PA-C    vancomycin in dextrose 5 % 1 gram/250 mL IVPB 1,000 mg, 1,000 mg, Intravenous, Q8H, Sherin Rockwell MD, 1,000 mg at 03/18/20 1400      Physical Exam:    No intake or output data in the 24 hours ending 03/18/20 1729  Wt Readings from Last 3 Encounters:   03/13/20 56.6 kg (124 lb 12.5 oz)   03/12/20 52.6 kg (116 lb)   02/01/19 56.1 kg (123 lb 10.9 oz)       /72 (BP Location: Right arm, Patient Position: Lying)   Pulse 73   Temp 97.5 °F (36.4 °C) (Oral)   Resp 20   Ht 5' 5" (1.651 m)   Wt 56.6 kg (124 lb 12.5 oz)   LMP 02/28/2020 (Exact Date)   SpO2 97%   Breastfeeding? No   BMI 20.76 kg/m²     GEN: NAD, +anxious  Neck: +Left neck swelling, tenderness resolved  Resp: CTAB, no wheezes or rales, normal work of breathing   CV: RRR, no m/r/g, no edema  GI: soft, NTND    Laboratory:    Recent Labs   Lab 03/16/20  0420 03/17/20  0523 03/18/20  0502   WBC 15.29* 18.60* 12.55   HGB 9.9* 10.8* 11.3*   HCT 30.7* 34.0* 34.8*    333 338     Recent Labs   Lab 03/16/20  0420 03/17/20  0523 03/18/20  0502    140 136   K 4.0 3.7 3.4*    107 102   CO2 22* 24 25   BUN 18 24* 15   CREATININE 0.7 0.8 0.7   * 72 87   CALCIUM 8.4* 8.7 8.4*     Recent Labs   Lab " 03/16/20  0420 03/17/20  0523 03/18/20  0502   ALKPHOS 74 71 62   ALT 22 35 29   AST 16 26 18   ALBUMIN 2.5* 2.6* 2.7*   PROT 7.1 7.1 7.3   BILITOT 0.2 0.2 0.4      No results for input(s): POCTGLUCOSE in the last 168 hours.  No results for input(s): CPK, CPKMB, MB, TROPONINI in the last 72 hours.    No results for input(s): LACTATE in the last 72 hours.     No results for input(s): POCTGLUCOSE in the last 168 hours.  No results found for: LABA1C, HGBA1C      Microbiology:  Microbiology Results (last 7 days)     Procedure Component Value Units Date/Time    AFB Culture & Smear [[id number]] Collected:  03/17/20 1347    Order Status:  Completed Specimen:  Abscess from Neck Updated:  03/18/20 1441     AFB CULTURE STAIN No acid fast bacilli seen.    Narrative:       1. Left neck level 2    Culture, Anaerobe [[id number]] Collected:  03/17/20 1347    Order Status:  Completed Specimen:  Abscess from Neck Updated:  03/18/20 0854     Anaerobic Culture Culture in progress    Narrative:       1. Left neck level 2    Aerobic culture [[id number]] Collected:  03/17/20 1347    Order Status:  Completed Specimen:  Abscess from Neck Updated:  03/18/20 0823     Aerobic Bacterial Culture No growth    Narrative:       1. Left neck level 2    Gram stain [[id number]] Collected:  03/17/20 1347    Order Status:  Completed Specimen:  Abscess from Neck Updated:  03/17/20 1723     Gram Stain Result Rare WBC's      Rare Gram positive cocci    Narrative:       1. Left neck level 2    Fungus culture [[id number]] Collected:  03/17/20 1347    Order Status:  Sent Specimen:  Abscess from Neck Updated:  03/17/20 1416    Influenza A & B by Molecular [[id number]] Collected:  03/13/20 1402    Order Status:  Completed Specimen:  Nasopharyngeal Swab Updated:  03/13/20 1456     Influenza A, Molecular Negative     Influenza B, Molecular Negative     Flu A & B Source Nasal swab    Respiratory Viral Panel by PCR [facility]; Nasal Swab [[id number]] Collected:  03/13/20  1403    Order Status:  Sent Specimen:  Respiratory Updated:  03/13/20 1419            Imaging  ECG Results          EKG 12-lead (Final result)  Result time 03/13/20 16:45:11    Final result by Interface, Lab In [facility] (03/13/20 16:45:11)                 Narrative:    Test Reason : R00.0,    Vent. Rate : 102 BPM     Atrial Rate : 102 BPM     P-R Int : 124 ms          QRS Dur : 082 ms      QT Int : 326 ms       P-R-T Axes : 080 091 049 degrees     QTc Int : 424 ms    Age and gender specific analysis  Sinus tachycardia  Right atrial enlargement  Rightward axis  Pulmonary disease pattern  Abnormal ECG  No previous ECGs available  Confirmed by DENY THOMAS MD ([id number]) on 3/13/2020 4:44:58 PM    Referred By: TRIEC   SELF           Confirmed By:DENY THOMAS MD                              No results found for this or any previous visit.    CT Soft Tissue Neck With Contrast  Narrative: EXAMINATION:  CT SOFT TISSUE NECK WITH CONTRAST    CLINICAL HISTORY:  left neck swelling;    TECHNIQUE:  Axial CT images obtained throughout the region of the neck after the administration of 75 mL Omnipaque 350 intravenous contrast. Axial, sagittal and coronal reconstructions were performed.    COMPARISON:  03/12/2020    FINDINGS:  Multiple asymmetric prominent and enhancing lymph nodes present throughout the left neck.  Most do not appear pathologically enlarged by size criteria, and most are decreased in size from the prior CT.  However, left level 2 lymph node station is and enlarging rim enhancing collection.  This collection appears better organized with a more discrete well-defined wall.  Collection measuring up to 2.7 x 2.1 cm in maximal transverse dimension and over 3.6 cm in craniocaudal dimension.  Surrounding inflammatory stranding insinuating throughout the deep spaces of the neck as well as in extending into the subcutaneous soft tissues.  Extent of fluid and stranding is not significantly increased from the recent CT.  No  new rim enhancing collections identified elsewhere.  No significant mass effect on the airway.    The soft tissues of the nasopharynx, oropharynx, hypopharynx and larynx are within normal limits.    There is heterogeneity with multiple punctate calcifications in fatty atrophy involving the parotid glands bilaterally.  Additional fatty atrophy of the submandibular glands.  Submandibular glands bilaterally.  The thyroid glands are unremarkable.    Major vessels of the neck appear patent. Limited intracranial evaluation is within normal limits.  The visualized paranasal sinuses and mastoid air cells are essentially clear.    Small bilateral pleural effusions.    No osseous lytic or blastic lesions are evident.  Impression: Enlarging rim enhancing collection in the left neck centered at left level 2 lymph node station, thought to suppurative lymph node with abscess formation.  Collection now measuring up to 3.6 cm in size, with more well-defined discrete margins.    Persistent surrounding inflammatory stranding throughout the left neck without significant mass effect on the airway.    Asymmetrically prominent and enhancing lymph nodes elsewhere throughout the left neck, although mostly decreased in size from the recent CT of 03/12/2020.    No new rim enhancing collections identified elsewhere.    Heterogeneity and fatty atrophy of the parotid and submandibular glands bilaterally.  Clinical correlation advised with underlying systemic/autoimmune disorder be considered.    Small bilateral pleural effusions, new from prior.    This report was flagged in Epic as abnormal.    Electronically signed by: Stephon Calabrese MD  Date:    03/17/2020  Time:    12:18        Assessment and Plan:    Active Hospital Problems    Diagnosis  POA    *Neck mass [R22.1]  Yes    Neck infection [L08.9]  Yes      Resolved Hospital Problems    Diagnosis Date Resolved POA    Suspected Covid-19 Virus Infection [R68.89] 03/18/2020 Yes  "      Suspected Covid-19 Virus Infection    Person under investigation (PUI) for COVID-19 (resolved)    - COVID-19 testing sent 3/13/2020 at 1522 and result NEGATIVE  - Infection Control notified  - Isolation discontinued  - Diagnostics              - CBC shows recurrent leukocytosis              - CMP stable               - .2              - PCT negative              - d-dimer, ferritin, troponin, LDH deferred              - ECG shows sinus tach though with rightward axis              - rapid Flu negative              - RIP in process - not sure if this is actually happening or not, but since she has been admitted hospital policy has shifted in favor of not collecting              - Legionella deferred              - Blood culture NGTD              - Portable CXR "Mild interstitial basilar infiltrate without confluent infiltrate or consolidation"    Neck mass     - s/p I&D, drain removed this morning  - suspect abscess vs. necrotic node/lesion  - continue IV antibiotics  - deescalate to orals tomorrow and discharge    Tyrel Mobley PA-C  Department of Hospital Medicine  Spectra [phone]     "

## 2020-03-18 NOTE — PROGRESS NOTES
Pharmacokinetic Assessment Follow Up: IV Vancomycin    Vancomycin serum concentration assessment(s):  · The trough level was drawn correctly and can be used to guide therapy at this time.   · The measurement is within the desired definitive target range of 10 to 15 mcg/mL.    Vancomycin Regimen Plan:  · Continue Vancomycin 1000 mg IV every 8 hours   · Next serum trough concentration will be measured at 0500 prior to the dose on 3/20 to confirm stability     Drug levels (last 3 results):  Recent Labs   Lab Result Units 03/18/20  1402   Vancomycin-Trough ug/mL 10.1     Pharmacy will continue to follow and monitor vancomycin.    Please contact pharmacy at extension [phone] for questions regarding this assessment.    Thank you for the consult,   Marci Toscano     Patient brief summary:  Dee Mills is a 29 y.o. female initiated on antimicrobial therapy with IV Vancomycin for treatment of skin & soft tissue infection    Drug Allergies:   Review of patient's allergies indicates:   Allergen Reactions    Clindamycin Nausea And Vomiting     Actual Body Weight:   56.6 kg     Renal Function:   Estimated Creatinine Clearance: 106.9 mL/min (based on SCr of 0.7 mg/dL).,     CBC (last 72 hours):  Recent Labs   Lab Result Units 03/16/20  0420 03/17/20  0523 03/18/20  0502   WBC K/uL 15.29* 18.60* 12.55   Hemoglobin g/dL 9.9* 10.8* 11.3*   Hematocrit % 30.7* 34.0* 34.8*   Platelets K/uL 292 333 338   Gran% % 78.3* 61.3 60.6   Lymph% % 16.5* 29.1 28.2   Mono% % 3.5* 8.0 9.2   Eosinophil% % 0.0 0.0 0.1   Basophil% % 0.1 0.2 0.2   Differential Method  Automated Automated Automated       Metabolic Panel (last 72 hours):  Recent Labs   Lab Result Units 03/16/20  0420 03/17/20  0523 03/18/20  0502   Sodium mmol/L 138 140 136   Potassium mmol/L 4.0 3.7 3.4*   Chloride mmol/L 107 107 102   CO2 mmol/L 22* 24 25   Glucose mg/dL 130* 72 87   BUN, Bld mg/dL 18 24* 15   Creatinine mg/dL 0.7 0.8 0.7   Albumin g/dL 2.5* 2.6* 2.7*   Total  Bilirubin mg/dL 0.2 0.2 0.4   Alkaline Phosphatase U/L 74 71 62   AST U/L 16 26 18   ALT U/L 22 35 29       Vancomycin Administrations:  vancomycin given in the last 96 hours                   vancomycin in dextrose 5 % 1 gram/250 mL IVPB 1,000 mg (mg) 1,000 mg New Bag 03/18/20 1400     1,000 mg New Bag  0443     1,000 mg New Bag 03/17/20 2125     1,000 mg New Bag  1200                Microbiologic Results:  Microbiology Results (last 7 days)     Procedure Component Value Units Date/Time    AFB Culture & Smear [[id number]] Collected:  03/17/20 1347    Order Status:  Completed Specimen:  Abscess from Neck Updated:  03/18/20 1441     AFB CULTURE STAIN No acid fast bacilli seen.    Narrative:       1. Left neck level 2    Culture, Anaerobe [[id number]] Collected:  03/17/20 1347    Order Status:  Completed Specimen:  Abscess from Neck Updated:  03/18/20 0854     Anaerobic Culture Culture in progress    Narrative:       1. Left neck level 2    Aerobic culture [[id number]] Collected:  03/17/20 1347    Order Status:  Completed Specimen:  Abscess from Neck Updated:  03/18/20 0823     Aerobic Bacterial Culture No growth    Narrative:       1. Left neck level 2    Gram stain [[id number]] Collected:  03/17/20 1347    Order Status:  Completed Specimen:  Abscess from Neck Updated:  03/17/20 1723     Gram Stain Result Rare WBC's      Rare Gram positive cocci    Narrative:       1. Left neck level 2    Fungus culture [[id number]] Collected:  03/17/20 1347    Order Status:  Sent Specimen:  Abscess from Neck Updated:  03/17/20 1416    Influenza A & B by Molecular [[id number]] Collected:  03/13/20 1402    Order Status:  Completed Specimen:  Nasopharyngeal Swab Updated:  03/13/20 1456     Influenza A, Molecular Negative     Influenza B, Molecular Negative     Flu A & B Source Nasal swab    Respiratory Viral Panel by PCR [facility]; Nasal Swab [[id number]] Collected:  03/13/20 1403    Order Status:  Sent Specimen:  Respiratory Updated:  03/13/20  2148

## 2020-03-18 NOTE — SUBJECTIVE & OBJECTIVE
Patient physically not seen today. Per policy, COVID pending are seen primarily by Staff. Majority of subjective/objective findings are relayed remotely from the patient via nursing    Interval History: Patient reports pain and movement improved. Gram stain growing GPC.     Medications:  Continuous Infusions:  Scheduled Meds:   piperacillin-tazobactam (ZOSYN) IVPB  4.5 g Intravenous Q8H    vancomycin (VANCOCIN) IVPB  1,000 mg Intravenous Q8H     PRN Meds:acetaminophen, albuterol, glucose, glucose, melatonin, ondansetron, polyethylene glycol, prochlorperazine, sodium chloride 0.9%, traMADoL, Pharmacy to dose Vancomycin consult **AND** vancomycin - pharmacy to dose     Review of patient's allergies indicates:   Allergen Reactions    Clindamycin Nausea And Vomiting     Objective:     Vital Signs (24h Range):  Temp:  [96.4 °F (35.8 °C)-98.1 °F (36.7 °C)] 96.4 °F (35.8 °C)  Pulse:  [69-80] 80  Resp:  [17-18] 18  SpO2:  [95 %-99 %] 99 %  BP: ()/(59-85) 129/85       Lines/Drains/Airways     Peripheral Intravenous Line                 Peripheral IV - Single Lumen 03/17/20 1201 22 G Left Antecubital less than 1 day         Peripheral IV - Single Lumen 03/18/20 1145 20 G Anterior;Left Forearm less than 1 day                Physical Exam    NAD  Wound with dressing in place. Gauze has not completely saturated through.   Bilateral diffuse lymphadenopathy.   CN2-12 grossly intact  Moving extremities       Significant Labs:  ABGs: No results for input(s): PH, PCO2, HCO3, POCSATURATED, BE in the last 168 hours.  BMP:   Recent Labs   Lab 03/18/20  0502   GLU 87      CO2 25   BUN 15   CREATININE 0.7   CALCIUM 8.4*     Cardiac Markers: No results for input(s): CKMB, TROPONINT, MYOGLOBIN in the last 168 hours.  CBC:   Recent Labs   Lab 03/18/20  0502   WBC 12.55   RBC 3.72*   HGB 11.3*   HCT 34.8*      MCV 94   MCH 30.4   MCHC 32.5     CMP:   Recent Labs   Lab 03/18/20  0502   GLU 87   CALCIUM 8.4*   ALBUMIN 2.7*    PROT 7.3      K 3.4*   CO2 25      BUN 15   CREATININE 0.7   ALKPHOS 62   ALT 29   AST 18   BILITOT 0.4     Coagulation: No results for input(s): LABPROT, INR, APTT in the last 168 hours.  CRP:   Recent Labs   Lab 03/14/20  0444   .2*     ESR: No results for input(s): ERYTHROCYTES in the last 168 hours.  LDH: No results for input(s): LDH in the last 168 hours.  LFTs:   Recent Labs   Lab 03/18/20  0502   ALT 29   AST 18   ALKPHOS 62   BILITOT 0.4   PROT 7.3   ALBUMIN 2.7*     Microbiology Results (last 7 days)     Procedure Component Value Units Date/Time    Culture, Anaerobe [[id number]] Collected:  03/17/20 1347    Order Status:  Completed Specimen:  Abscess from Neck Updated:  03/18/20 0854     Anaerobic Culture Culture in progress    Narrative:       1. Left neck level 2    Aerobic culture [[id number]] Collected:  03/17/20 1347    Order Status:  Completed Specimen:  Abscess from Neck Updated:  03/18/20 0823     Aerobic Bacterial Culture No growth    Narrative:       1. Left neck level 2    Gram stain [[id number]] Collected:  03/17/20 1347    Order Status:  Completed Specimen:  Abscess from Neck Updated:  03/17/20 1723     Gram Stain Result Rare WBC's      Rare Gram positive cocci    Narrative:       1. Left neck level 2    AFB Culture & Smear [[id number]] Collected:  03/17/20 1347    Order Status:  Sent Specimen:  Abscess from Neck Updated:  03/17/20 1417    Fungus culture [[id number]] Collected:  03/17/20 1347    Order Status:  Sent Specimen:  Abscess from Neck Updated:  03/17/20 1416    Influenza A & B by Molecular [[id number]] Collected:  03/13/20 1402    Order Status:  Completed Specimen:  Nasopharyngeal Swab Updated:  03/13/20 1456     Influenza A, Molecular Negative     Influenza B, Molecular Negative     Flu A & B Source Nasal swab    Respiratory Viral Panel by PCR [facility]; Nasal Swab [[id number]] Collected:  03/13/20 1403    Order Status:  Sent Specimen:  Respiratory Updated:  03/13/20 1419           Significant Diagnostics:  None

## 2020-03-18 NOTE — OP NOTE
DATE OF OPERATION:@     SURGEON:  Tal Valentin MD     ASSISTANT SURGEON:  Jan Lucas MD     OPERATION:     1. Incision and drainage of left neck abscess.  2. Left level 2 cervical lymphadenectomy.      PREOPERATIVE DIAGNOSIS:     1. Abscess of left neck.  2. Cervical lymphadenopathy.  3. Suspected COVID-19.     POSTOPERATIVE DIAGNOSIS:     1. Abscess of left neck.  2. Cervical lymphadenopathy.  3. Suspected COVID-19.     ANESTHESIA: General.     COMPLICATIONS: None.     ESTIMATED BLOOD LOSS:  20 mL     SPECIMENS: Left neck mass for permanent and fresh for flow cytometry.  Abscess culture for bacteria, fungus and AFB.     FINDINGS: Dominant matted node in left level 2 containing purulent fluid.  Surrounding numerous enlarged lymph nodes.     INDICATIONS: Localized soft tissue infection with abscess not resolving with medical management.     I discussed the risks, benefits and alternatives of the procedure with the patient, as well as the expected postoperative course.  I gave her the opportunity to ask questions and I answered all of them.  Prior to surgery I again met with the patient and reviewed the indications for surgery and she consented to proceed.     DESCRIPTION OF PROCEDURE: The patient was brought into the operating room and placed supine on the operating table. The patient was placed under general anesthesia and intubated. A shoulder roll and a donut were placed under the patient's head. A time-out was called to confirm the proper patient, site and procedure. The patient was prepped and draped in the usual fashion.  Due to the status of the patient as a suspected COVID-19 case (test result pending), the procedure was performed with minimal staff utilizing full PPE under the direction of infection control personnel.     After marking the neck, anesthetic of 1% lidocaine with 1:100,000 epinephrine was infiltrated locally.  A 3 cm transverse incision was made with a bovie at a setting of 25 de león two  finger-breadths below the mandible, over the center of the left neck mass.  The platysma was  and the sternocleidomastoid muscle was retracted posteriorly.  The dominant node suspicious for abscess was encountered and dissected free of the external and internal jugular veins and facial vein.  The spinal accessory nerve was identified and preserved.  Fibrous tissue attachments were present around the mass which were sharply divided.  The mass was dissected free of the submandibular salivary gland and digastric muscle superiorly and the hypoglossal and glossopharyngeal nerves were identified and preserved.  Several smaller but enlarged nodes were dissected from the anterior jugular chain in continuity with the dominant mass.  The specimen was dissected free of the carotid artery medially and removed in entirety.  Toward the end of dissection the capsule was entered and purulent yellow-brown fluid was encountered.  This was swabbed for cultures of fungus, bacteria and mycobacteria.  A segment of the mass was sent in saline for flow cytometry analysis to rule out lymphoma.  The remainder of the specimen was went in formalin for histopathology.  The cavity was then copiously irrigated with saline.  After checking for hemostasis a 1/2-inch penrose drain was then placed into the surgical bed.  The platysma was closed with with 3-0 vicryl in interrupted fashion.  The skin was closed with 4-0 prolene suture in interrupted fashion with two stitches through the drain.  A sterile gauze dressing with paper tape was then applied.     The patient was turned back toward the anesthesiologist and transferred to the recovery room in stable condition.  The patient was extubated and transferred to the recovery room in stable condition.

## 2020-03-19 VITALS
WEIGHT: 124.75 LBS | TEMPERATURE: 98 F | OXYGEN SATURATION: 95 % | DIASTOLIC BLOOD PRESSURE: 78 MMHG | BODY MASS INDEX: 20.79 KG/M2 | HEIGHT: 65 IN | HEART RATE: 84 BPM | RESPIRATION RATE: 18 BRPM | SYSTOLIC BLOOD PRESSURE: 127 MMHG

## 2020-03-19 PROBLEM — L02.11 NECK ABSCESS: Status: ACTIVE | Noted: 2020-03-13

## 2020-03-19 PROBLEM — R22.1 NECK MASS: Status: RESOLVED | Noted: 2020-03-13 | Resolved: 2020-03-19

## 2020-03-19 LAB
ALBUMIN SERPL BCP-MCNC: 2.5 G/DL (ref 3.5–5.2)
ALP SERPL-CCNC: 59 U/L (ref 55–135)
ALT SERPL W/O P-5'-P-CCNC: 27 U/L (ref 10–44)
ANION GAP SERPL CALC-SCNC: 5 MMOL/L (ref 8–16)
AST SERPL-CCNC: 22 U/L (ref 10–40)
BACTERIA SPEC AEROBE CULT: ABNORMAL
BASOPHILS # BLD AUTO: 0.03 K/UL (ref 0–0.2)
BASOPHILS NFR BLD: 0.3 % (ref 0–1.9)
BILIRUB SERPL-MCNC: 0.4 MG/DL (ref 0.1–1)
BUN SERPL-MCNC: 15 MG/DL (ref 6–20)
CALCIUM SERPL-MCNC: 8.7 MG/DL (ref 8.7–10.5)
CHLORIDE SERPL-SCNC: 105 MMOL/L (ref 95–110)
CO2 SERPL-SCNC: 27 MMOL/L (ref 23–29)
CREAT SERPL-MCNC: 0.7 MG/DL (ref 0.5–1.4)
DIFFERENTIAL METHOD: ABNORMAL
ENTEROVIRUS: NOT DETECTED
EOSINOPHIL # BLD AUTO: 0.1 K/UL (ref 0–0.5)
EOSINOPHIL NFR BLD: 1.5 % (ref 0–8)
ERYTHROCYTE [DISTWIDTH] IN BLOOD BY AUTOMATED COUNT: 12.4 % (ref 11.5–14.5)
EST. GFR  (AFRICAN AMERICAN): >60 ML/MIN/1.73 M^2
EST. GFR  (NON AFRICAN AMERICAN): >60 ML/MIN/1.73 M^2
FLOW CYTOMETRY ANTIBODIES ANALYZED - LYMPH NODE: NORMAL
FLOW CYTOMETRY COMMENT - LYMPH NODE: NORMAL
FLOW CYTOMETRY INTERPRETATION - LYMPH NODE: NORMAL
GLUCOSE SERPL-MCNC: 74 MG/DL (ref 70–110)
HCT VFR BLD AUTO: 35.5 % (ref 37–48.5)
HGB BLD-MCNC: 11.2 G/DL (ref 12–16)
HUMAN BOCAVIRUS: NOT DETECTED
HUMAN CORONAVIRUS, COMMON COLD VIRUS: NOT DETECTED
IMM GRANULOCYTES # BLD AUTO: 0.23 K/UL (ref 0–0.04)
IMM GRANULOCYTES NFR BLD AUTO: 2.4 % (ref 0–0.5)
INFLUENZA A - H1N1-09: NOT DETECTED
LYMPHOCYTES # BLD AUTO: 3.5 K/UL (ref 1–4.8)
LYMPHOCYTES NFR BLD: 36.4 % (ref 18–48)
MCH RBC QN AUTO: 29.9 PG (ref 27–31)
MCHC RBC AUTO-ENTMCNC: 31.5 G/DL (ref 32–36)
MCV RBC AUTO: 95 FL (ref 82–98)
MONOCYTES # BLD AUTO: 0.9 K/UL (ref 0.3–1)
MONOCYTES NFR BLD: 8.9 % (ref 4–15)
NEUTROPHILS # BLD AUTO: 4.8 K/UL (ref 1.8–7.7)
NEUTROPHILS NFR BLD: 50.5 % (ref 38–73)
NRBC BLD-RTO: 0 /100 WBC
PARAINFLUENZA: NOT DETECTED
PLATELET # BLD AUTO: 363 K/UL (ref 150–350)
PMV BLD AUTO: 11 FL (ref 9.2–12.9)
POTASSIUM SERPL-SCNC: 4.5 MMOL/L (ref 3.5–5.1)
PROT SERPL-MCNC: 7 G/DL (ref 6–8.4)
RBC # BLD AUTO: 3.74 M/UL (ref 4–5.4)
RVP - ADENOVIRUS: NOT DETECTED
RVP - HUMAN METAPNEUMOVIRUS (HMPV): NOT DETECTED
RVP - INFLUENZA A: NOT DETECTED
RVP - INFLUENZA B: NOT DETECTED
RVP - RESPIRATORY SYNCTIAL VIRUS (RSV) A: NOT DETECTED
RVP - RESPIRATORY VIRAL PANEL, SOURCE: NORMAL
RVP - RHINOVIRUS: NOT DETECTED
SODIUM SERPL-SCNC: 137 MMOL/L (ref 136–145)
WBC # BLD AUTO: 9.55 K/UL (ref 3.9–12.7)

## 2020-03-19 PROCEDURE — 63600175 PHARM REV CODE 636 W HCPCS: Performed by: PHYSICIAN ASSISTANT

## 2020-03-19 PROCEDURE — 36415 COLL VENOUS BLD VENIPUNCTURE: CPT

## 2020-03-19 PROCEDURE — 80053 COMPREHEN METABOLIC PANEL: CPT

## 2020-03-19 PROCEDURE — 63600175 PHARM REV CODE 636 W HCPCS: Performed by: INTERNAL MEDICINE

## 2020-03-19 PROCEDURE — 99239 PR HOSPITAL DISCHARGE DAY,>30 MIN: ICD-10-PCS | Mod: ,,, | Performed by: HOSPITALIST

## 2020-03-19 PROCEDURE — 85025 COMPLETE CBC W/AUTO DIFF WBC: CPT

## 2020-03-19 PROCEDURE — 25000003 PHARM REV CODE 250: Performed by: PHYSICIAN ASSISTANT

## 2020-03-19 PROCEDURE — 99239 HOSP IP/OBS DSCHRG MGMT >30: CPT | Mod: ,,, | Performed by: HOSPITALIST

## 2020-03-19 RX ORDER — ACETAMINOPHEN 325 MG/1
650 TABLET ORAL EVERY 6 HOURS PRN
Qty: 120 TABLET | Refills: 0 | Status: SHIPPED | OUTPATIENT
Start: 2020-03-19

## 2020-03-19 RX ORDER — BACITRACIN ZINC 500 UNIT/G
OINTMENT (GRAM) TOPICAL 2 TIMES DAILY
Refills: 0 | COMMUNITY
Start: 2020-03-19

## 2020-03-19 RX ORDER — TRAMADOL HYDROCHLORIDE 50 MG/1
50 TABLET ORAL EVERY 8 HOURS PRN
Qty: 21 TABLET | Refills: 0 | Status: SHIPPED | OUTPATIENT
Start: 2020-03-19 | End: 2020-03-26

## 2020-03-19 RX ORDER — CLINDAMYCIN HYDROCHLORIDE 150 MG/1
450 CAPSULE ORAL EVERY 6 HOURS
Qty: 84 CAPSULE | Refills: 0 | Status: SHIPPED | OUTPATIENT
Start: 2020-03-19 | End: 2020-03-26

## 2020-03-19 RX ORDER — ONDANSETRON 4 MG/1
4 TABLET, ORALLY DISINTEGRATING ORAL EVERY 6 HOURS PRN
Qty: 40 TABLET | Refills: 0 | Status: SHIPPED | OUTPATIENT
Start: 2020-03-19

## 2020-03-19 RX ADMIN — TRAMADOL HYDROCHLORIDE 50 MG: 50 TABLET, FILM COATED ORAL at 02:03

## 2020-03-19 RX ADMIN — ACETAMINOPHEN 650 MG: 325 TABLET ORAL at 09:03

## 2020-03-19 RX ADMIN — VANCOMYCIN HYDROCHLORIDE 1000 MG: 1 INJECTION, POWDER, LYOPHILIZED, FOR SOLUTION INTRAVENOUS at 05:03

## 2020-03-19 RX ADMIN — PIPERACILLIN AND TAZOBACTAM 4.5 G: 4; .5 INJECTION, POWDER, LYOPHILIZED, FOR SOLUTION INTRAVENOUS; PARENTERAL at 03:03

## 2020-03-19 NOTE — ASSESSMENT & PLAN NOTE
28 y/o F with a left neck mass present since 3/11/19 and fevers since 3/10/19. CT scan demonstrated diffuse lymphadenopathy with left hypodense rim enhancing collection. Differential to include left neck abscess versus necrotic/suppurative lymph node in the setting of diffuse lymph node proliferation (lymphoproliferative disorder). POD2 s/p I&D of left neck abscess and Level 2 Lymphadenectomy. Doing well     COVID testing - NEGATIVE     -F/u speciation   GPC on gram stain  -F/u path  -Wound care to wound.    No bathing or submerging wound in sitting water. OK to shower, running water only. Gently pad dry. May clean incision site with peroxide daily to debride crusting. May apply Aquaphor or bacitracin to wound twice daily. Change gauze as needed  -From ENT/HNS prospective, patient can be transitioned to PO abx and discharged home today. We will see her in 1 week for suture removal with Dr. Valentin (I have already messaged our clinic nurse).    Defer dispo to primary team  -Remainder of care per primary team.   -Page with questions/concerns

## 2020-03-19 NOTE — PLAN OF CARE
Pt AAOx4, VSS, No falls noted, fall precautions remain. Pain assessed, pain controlled with PRN regimen, pt comfortable, frequent rounds made for safety and patient care. Call light within reach, bed wheels locked, bed in lowest position, side rails ^x2, safety maintained. NADN, Will continue monitor.

## 2020-03-19 NOTE — PROGRESS NOTES
Patient discharging home, self care. Patient verbalizes understanding of discharge instructions and follow up appointments. Rx to be picked up from [facility].

## 2020-03-19 NOTE — PLAN OF CARE
03/19/20 1228   Final Note   Assessment Type Final Discharge Note   Anticipated Discharge Disposition Home   What phone number can be called within the next 1-3 days to see how you are doing after discharge? [phone]   Hospital Follow Up  Appt(s) scheduled? Yes   Discharge plans and expectations educations in teach back method with documentation complete? Yes     Future Appointments   Date Time Provider Department Center   3/24/2020  2:30 PM Tal Valentin MD [facility] ENT [facility]

## 2020-03-19 NOTE — DISCHARGE SUMMARY
[facility] Hospital Medicine  Discharge Summary      Patient Name: Dee Mills  MRN: [id number]  Admission Date: 3/13/2020  Hospital Length of Stay: 6 days  Discharge Date and Time:  03/19/2020 4:52 AM  Attending Physician: Immanuel Glover MD   Discharging Provider: Junior Vargas MD  Primary Care Provider: Primary Doctor [facility] Medicine Team: [facility]OSP MED D Junior Vargas MD    HPI:     Ms. Mills is a 29-year-old woman with no known medical history who presented with neck pain and upper respiratory symptoms and was admitted on 3/15 with a neck mass, thought to be abscess vs. necrotic node/lesion complicated by suspected Covid-19 virus infection. She was started on steroids and broad-spectrum antibiotics on admission with marked improvement. ENT is evaluating on a daily basis, currently planning on CT once COVID ruled-out, potentially with operative intervention to follow.     Interval History:        Patient's neck pain and swelling progressively worsened, . She was started on steroids and broad-spectrum antibiotics on admission with marked improvement. ENT is evaluating on a daily basis, currently planning on CT once COVID ruled-out, potentially with operative intervention to follow.  3/18  COVID-19 testing was negative  - s/p I&D of left neck abscess and Level 2 Lymphadenectomy on 3/17  - s/p removal of drain on 3/18  - F/u cultures and speciation and path  -  Per ENT, 1 more day of IV abx. can likely go with a PO abx regimen. Outpatient ENT followup in 1 week for suture removal with Dr. Whiteside      Procedure(s) (LRB):  INCISION AND DRAINAGE, ABSCESS NECK (Left)      Hospital Course:        Suspected Covid-19 Virus Infection    Person under investigation (PUI) for COVID-19 (resolved)     - COVID-19 testing sent 3/13/2020 at 1522 and result NEGATIVE  - Infection Control notified  - Isolation discontinued  - Diagnostics              - CBC shows recurrent  "leukocytosis              - CMP stable               - .2              - PCT negative              - d-dimer, ferritin, troponin, LDH deferred              - ECG shows sinus tach though with rightward axis              - rapid Flu negative              - RIP in process - not sure if this is actually happening or not, but since she has been admitted hospital policy has shifted in favor of not collecting              - Legionella deferred              - Blood culture NGTD              - Portable CXR "Mild interstitial basilar infiltrate without confluent infiltrate or consolidation"     Neck mass   left neck mass present since 3/11/19 and fevers since 3/10/19. Patient's neck pain and swelling progressively worsened, . She was started on steroids and broad-spectrum antibiotics on admission with marked improvement. ENT followin. CT scan shows diffuse lymphadenopathy with left hypodense rim enhancing collection which may represent left neck abscess versus necrotic/suppurative lymph node in the setting of diffuse lymph node proliferation  3/18  COVID-19 testing was negative  - s/p I&D of left neck abscess and Level 2 Lymphadenectomy on 3/17  -Differential to include left neck abscess versus necrotic/suppurative lymph node in the setting of diffuse lymph node proliferation (lymphoproliferative disorder). pathology pending  - s/p removal of  penrose drain on 3/18  - F/u cultures and speciation and path  -  Per ENT, 1 more day of IV abx. can likely go with a PO abx regimen. Outpatient ENT followup in 1 week for suture removal with Dr. Whiteside  - s/p I&D, drain removed 3/18  - suspect abscess vs. necrotic node/lesion    - deescalate to orals antibiotics  and discharge. started on oral clindamycin. x 7 days F/U 1 week for suture removal with Dr. Valentin   - Aerobic culture with streptococcus pyogenes     Follow up in 1 week for suture removal with Dr. Valentin .     Consults:   Consults (From admission, onward)        Status " Ordering Provider     Inpatient consult to ENT  Once     Provider:  (Not yet assigned)    Completed MAMI ROSS     Pharmacy to dose Vancomycin consult  Once     Provider:  (Not yet assigned)    Acknowledged PATRICK FONTAINE JR          Final Active Diagnoses:    Diagnosis Date Noted POA    PRINCIPAL PROBLEM:  Neck mass [R22.1] 03/13/2020 Yes    Neck infection [L08.9] 03/13/2020 Yes      Problems Resolved During this Admission:    Diagnosis Date Noted Date Resolved POA    Suspected Covid-19 Virus Infection [R68.89] 03/14/2020 03/18/2020 Yes      Discharged Condition: fair    Disposition: Home or Self Care    Follow Up:    Patient Instructions:      Ambulatory referral/consult to Rheumatology   Standing Status: Future   Referral Priority: Routine Referral Type: Consultation   Referral Reason: Specialty Services Required   Requested Specialty: Rheumatology   Number of Visits Requested: 1     Medications:  Reconciled Home Medications:      Medication List      Start taking these medications    acetaminophen 325 MG tablet  Commonly known as:  TYLENOL  Take 2 tablets (650 mg total) by mouth every 6 (six) hours as needed for Pain.     bacitracin 500 unit/gram Oint  Apply topically 2 (two) times daily.     traMADoL 50 mg tablet  Commonly known as:  ULTRAM  Take 1 tablet (50 mg total) by mouth every 8 (eight) hours as needed.        Change how you take these medications    clindamycin 150 MG capsule  Commonly known as:  CLEOCIN  Take 3 capsules (450 mg total) by mouth every 6 (six) hours. for 7 days  What changed:  when to take this        Continue taking these medications    ondansetron 4 MG Tbdl  Commonly known as:  ZOFRAN-ODT  Take 1 tablet (4 mg total) by mouth every 6 (six) hours as needed (nausea or vomiting).        Stop taking these medications    ISIBLOOM 0.15-0.03 mg per tablet  Generic drug:  desogestreL-ethinyl estradioL     ketorolac 10 mg tablet  Commonly known as:  TORADOL            Significant  Diagnostic Studies: Labs:   BMP:   Recent Labs   Lab 03/17/20  0523 03/18/20  0502   GLU 72 87    136   K 3.7 3.4*    102   CO2 24 25   BUN 24* 15   CREATININE 0.8 0.7   CALCIUM 8.7 8.4*   , CMP   Recent Labs   Lab 03/17/20  0523 03/18/20  0502    136   K 3.7 3.4*    102   CO2 24 25   GLU 72 87   BUN 24* 15   CREATININE 0.8 0.7   CALCIUM 8.7 8.4*   PROT 7.1 7.3   ALBUMIN 2.6* 2.7*   BILITOT 0.2 0.4   ALKPHOS 71 62   AST 26 18   ALT 35 29   ANIONGAP 9 9   ESTGFRAFRICA >60.0 >60.0   EGFRNONAA >60.0 >60.0   , CBC   Recent Labs   Lab 03/17/20  0523 03/18/20  0502   WBC 18.60* 12.55   HGB 10.8* 11.3*   HCT 34.0* 34.8*    338   , INR No results found for: INR, PROTIME, Lipid Panel No results found for: CHOL, HDL, LDLCALC, TRIG, CHOLHDL, Troponin No results for input(s): TROPONINI in the last 168 hours., A1C: No results for input(s): HGBA1C in the last 4320 hours. and All labs within the past 24 hours have been reviewed  Microbiology:   Blood Culture   Lab Results   Component Value Date    LABBLOO No growth after 5 days. 03/13/2020   , Sputum Culture No results found for: GSRESP, RESPIRATORYC, Urine Culture  No results found for: LABURIN and Wound Culture: positive for streptococcus pyogenes  Radiology:   Imaging Results          X-Ray Chest AP Portable (Final result)  Result time 03/14/20 05:34:35    Final result by Rupert Cook MD (03/14/20 05:34:35)             Impression:      Mild interstitial basilar infiltrate without confluent infiltrate or consolidation      Electronically signed by: Rupert Cook  Date:    03/14/2020  Time:    05:34           Narrative:    EXAMINATION:  XR CHEST AP PORTABLE    CLINICAL HISTORY:  concern for covid 19;    TECHNIQUE:  Single frontal view of the chest was performed.    COMPARISON:  None    FINDINGS:  Single chest view is submitted.  The cardiomediastinal silhouette appears appropriate.  There is appearance suggesting mild basilar interstitial  infiltrate there is no confluent infiltrate or consolidation, there is no evidence for significant pleural effusion or pneumothorax.  The osseous structures appear intact.                            Cardiac Graphics: EKG -Normal sinus rhythm  Left anterior fascicular block  Minimal voltage criteria for LVH, may be normal variant  ST and T wave abnormality, consider lateral ischemia  Abnormal ECG    Pending Diagnostic Studies:     Procedure Component Value Units Date/Time    CBC with Automated Differential [[id number]] Collected:  03/19/20 0420    Order Status:  Sent Lab Status:  In process Updated:  03/19/20 0420    Specimen:  Blood     Comprehensive metabolic panel [[id number]] Collected:  03/19/20 0420    Order Status:  Sent Lab Status:  In process Updated:  03/19/20 0420    Specimen:  Blood     Leukemia/Lymphoma Screen - Lymph Node Has a separate specimen been submitted and ordered for surgical pathology? Yes [[id number]] Collected:  03/17/20 1403    Order Status:  Sent Lab Status:  In process Updated:  03/18/20 0838    Specimen:  Lymph Node     Specimen to Pathology, Surgery ENT [[id number]] Collected:  03/17/20 1403    Order Status:  Sent Lab Status:  In process Updated:  03/18/20 1406        Indwelling Lines/Drains at time of discharge:   Lines/Drains/Airways     None               Junior Vargas MD  Department of Hospital Medicine  [facility]

## 2020-03-19 NOTE — SUBJECTIVE & OBJECTIVE
Interval History: COVID testing resulted negative. Feels better this AM.  Wound speciation pending but gram stain with gram positive cocci. Path pending.     Medications:  Continuous Infusions:  Scheduled Meds:   acetaminophen  650 mg Oral TID    piperacillin-tazobactam (ZOSYN) IVPB  4.5 g Intravenous Q8H    vancomycin (VANCOCIN) IVPB  1,000 mg Intravenous Q8H     PRN Meds:acetaminophen, albuterol, glucose, glucose, melatonin, ondansetron, polyethylene glycol, prochlorperazine, sodium chloride 0.9%, traMADoL, Pharmacy to dose Vancomycin consult **AND** vancomycin - pharmacy to dose     Review of patient's allergies indicates:   Allergen Reactions    Clindamycin Nausea And Vomiting     Objective:     Vital Signs (24h Range):  Temp:  [96 °F (35.6 °C)-97.6 °F (36.4 °C)] 97.6 °F (36.4 °C)  Pulse:  [66-80] 74  Resp:  [17-20] 18  SpO2:  [97 %-99 %] 98 %  BP: (111-129)/(72-88) 111/75       Lines/Drains/Airways     Peripheral Intravenous Line                 Peripheral IV - Single Lumen 03/17/20 1201 22 G Left Antecubital 1 day         Peripheral IV - Single Lumen 03/18/20 1145 20 G Anterior;Left Forearm less than 1 day                Physical Exam    NAD  Wound with dressing in place. Gauze exchanged.   Neck much softer. Incision intact with sutures.   Bilateral diffuse lymphadenopathy.   CN2-12 grossly intact  Moving extremities    Significant Labs:  ABGs: No results for input(s): PH, PCO2, HCO3, POCSATURATED, BE in the last 168 hours.  BMP:   Recent Labs   Lab 03/19/20  0420   GLU 74      CO2 27   BUN 15   CREATININE 0.7   CALCIUM 8.7     Cardiac Markers: No results for input(s): CKMB, TROPONINT, MYOGLOBIN in the last 168 hours.  CBC:   Recent Labs   Lab 03/19/20 0420   WBC 9.55   RBC 3.74*   HGB 11.2*   HCT 35.5*   *   MCV 95   MCH 29.9   MCHC 31.5*     CMP:   Recent Labs   Lab 03/19/20  0420   GLU 74   CALCIUM 8.7   ALBUMIN 2.5*   PROT 7.0      K 4.5   CO2 27      BUN 15   CREATININE 0.7    ALKPHOS 59   ALT 27   AST 22   BILITOT 0.4     Coagulation: No results for input(s): LABPROT, INR, APTT in the last 168 hours.  CRP:   Recent Labs   Lab 03/14/20  0444   .2*     ESR: No results for input(s): ERYTHROCYTES in the last 168 hours.  LDH: No results for input(s): LDH in the last 168 hours.  LFTs:   Recent Labs   Lab 03/19/20  0420   ALT 27   AST 22   ALKPHOS 59   BILITOT 0.4   PROT 7.0   ALBUMIN 2.5*     Microbiology Results (last 7 days)     Procedure Component Value Units Date/Time    AFB Culture & Smear [[id number]] Collected:  03/17/20 1347    Order Status:  Completed Specimen:  Abscess from Neck Updated:  03/18/20 2127     AFB Culture & Smear Culture in progress     AFB CULTURE STAIN No acid fast bacilli seen.    Narrative:       1. Left neck level 2    Culture, Anaerobe [[id number]] Collected:  03/17/20 1347    Order Status:  Completed Specimen:  Abscess from Neck Updated:  03/18/20 0854     Anaerobic Culture Culture in progress    Narrative:       1. Left neck level 2    Aerobic culture [[id number]] Collected:  03/17/20 1347    Order Status:  Completed Specimen:  Abscess from Neck Updated:  03/18/20 0823     Aerobic Bacterial Culture No growth    Narrative:       1. Left neck level 2    Gram stain [[id number]] Collected:  03/17/20 1347    Order Status:  Completed Specimen:  Abscess from Neck Updated:  03/17/20 1723     Gram Stain Result Rare WBC's      Rare Gram positive cocci    Narrative:       1. Left neck level 2    Fungus culture [[id number]] Collected:  03/17/20 1347    Order Status:  Sent Specimen:  Abscess from Neck Updated:  03/17/20 1416    Influenza A & B by Molecular [[id number]] Collected:  03/13/20 1402    Order Status:  Completed Specimen:  Nasopharyngeal Swab Updated:  03/13/20 1456     Influenza A, Molecular Negative     Influenza B, Molecular Negative     Flu A & B Source Nasal swab    Respiratory Viral Panel by PCR [facility]; Nasal Swab [[id number]] Collected:  03/13/20 1403     Order Status:  Sent Specimen:  Respiratory Updated:  03/13/20 1419          Significant Diagnostics:  None

## 2020-03-19 NOTE — PROGRESS NOTES
[facility]  Otorhinolaryngology-Head & Neck Surgery  Progress Note    Subjective:     Post-Op Info:  Procedure(s) (LRB):  INCISION AND DRAINAGE, ABSCESS NECK (Left)   2 Days Post-Op  Hospital Day: 7     Interval History: COVID testing resulted negative. Feels better this AM.  Wound speciation pending but gram stain with gram positive cocci. Path pending.     Medications:  Continuous Infusions:  Scheduled Meds:   acetaminophen  650 mg Oral TID    piperacillin-tazobactam (ZOSYN) IVPB  4.5 g Intravenous Q8H    vancomycin (VANCOCIN) IVPB  1,000 mg Intravenous Q8H     PRN Meds:acetaminophen, albuterol, glucose, glucose, melatonin, ondansetron, polyethylene glycol, prochlorperazine, sodium chloride 0.9%, traMADoL, Pharmacy to dose Vancomycin consult **AND** vancomycin - pharmacy to dose     Review of patient's allergies indicates:   Allergen Reactions    Clindamycin Nausea And Vomiting     Objective:     Vital Signs (24h Range):  Temp:  [96 °F (35.6 °C)-97.6 °F (36.4 °C)] 97.6 °F (36.4 °C)  Pulse:  [66-80] 74  Resp:  [17-20] 18  SpO2:  [97 %-99 %] 98 %  BP: (111-129)/(72-88) 111/75       Lines/Drains/Airways     Peripheral Intravenous Line                 Peripheral IV - Single Lumen 03/17/20 1201 22 G Left Antecubital 1 day         Peripheral IV - Single Lumen 03/18/20 1145 20 G Anterior;Left Forearm less than 1 day                Physical Exam    NAD  Wound with dressing in place. Gauze exchanged.   Neck much softer. Incision intact with sutures.   Bilateral diffuse lymphadenopathy.   CN2-12 grossly intact  Moving extremities    Significant Labs:  ABGs: No results for input(s): PH, PCO2, HCO3, POCSATURATED, BE in the last 168 hours.  BMP:   Recent Labs   Lab 03/19/20  0420   GLU 74      CO2 27   BUN 15   CREATININE 0.7   CALCIUM 8.7     Cardiac Markers: No results for input(s): CKMB, TROPONINT, MYOGLOBIN in the last 168 hours.  CBC:   Recent Labs   Lab 03/19/20  0420   WBC 9.55   RBC 3.74*    HGB 11.2*   HCT 35.5*   *   MCV 95   MCH 29.9   MCHC 31.5*     CMP:   Recent Labs   Lab 03/19/20  0420   GLU 74   CALCIUM 8.7   ALBUMIN 2.5*   PROT 7.0      K 4.5   CO2 27      BUN 15   CREATININE 0.7   ALKPHOS 59   ALT 27   AST 22   BILITOT 0.4     Coagulation: No results for input(s): LABPROT, INR, APTT in the last 168 hours.  CRP:   Recent Labs   Lab 03/14/20  0444   .2*     ESR: No results for input(s): ERYTHROCYTES in the last 168 hours.  LDH: No results for input(s): LDH in the last 168 hours.  LFTs:   Recent Labs   Lab 03/19/20  0420   ALT 27   AST 22   ALKPHOS 59   BILITOT 0.4   PROT 7.0   ALBUMIN 2.5*     Microbiology Results (last 7 days)     Procedure Component Value Units Date/Time    AFB Culture & Smear [[id number]] Collected:  03/17/20 1347    Order Status:  Completed Specimen:  Abscess from Neck Updated:  03/18/20 2127     AFB Culture & Smear Culture in progress     AFB CULTURE STAIN No acid fast bacilli seen.    Narrative:       1. Left neck level 2    Culture, Anaerobe [[id number]] Collected:  03/17/20 1347    Order Status:  Completed Specimen:  Abscess from Neck Updated:  03/18/20 0854     Anaerobic Culture Culture in progress    Narrative:       1. Left neck level 2    Aerobic culture [[id number]] Collected:  03/17/20 1347    Order Status:  Completed Specimen:  Abscess from Neck Updated:  03/18/20 0823     Aerobic Bacterial Culture No growth    Narrative:       1. Left neck level 2    Gram stain [[id number]] Collected:  03/17/20 1347    Order Status:  Completed Specimen:  Abscess from Neck Updated:  03/17/20 1723     Gram Stain Result Rare WBC's      Rare Gram positive cocci    Narrative:       1. Left neck level 2    Fungus culture [[id number]] Collected:  03/17/20 1347    Order Status:  Sent Specimen:  Abscess from Neck Updated:  03/17/20 1416    Influenza A & B by Molecular [[id number]] Collected:  03/13/20 1402    Order Status:  Completed Specimen:  Nasopharyngeal Swab  Updated:  03/13/20 1456     Influenza A, Molecular Negative     Influenza B, Molecular Negative     Flu A & B Source Nasal swab    Respiratory Viral Panel by PCR [facility]; Nasal Swab [[id number]] Collected:  03/13/20 1403    Order Status:  Sent Specimen:  Respiratory Updated:  03/13/20 1419          Significant Diagnostics:  None    Assessment/Plan:     Neck infection  30 y/o F with a left neck mass present since 3/11/19 and fevers since 3/10/19. CT scan demonstrated diffuse lymphadenopathy with left hypodense rim enhancing collection. Differential to include left neck abscess versus necrotic/suppurative lymph node in the setting of diffuse lymph node proliferation (lymphoproliferative disorder). POD2 s/p I&D of left neck abscess and Level 2 Lymphadenectomy. Doing well     COVID testing - NEGATIVE     -F/u speciation   GPC on gram stain  -F/u path  -Wound care to wound.    No bathing or submerging wound in sitting water. OK to shower, running water only. Gently pad dry. May clean incision site with peroxide daily to debride crusting. May apply Aquaphor or bacitracin to wound twice daily. Change gauze as needed  -From ENT/HNS prospective, patient can be transitioned to PO abx and discharged home today. We will see her in 1 week for suture removal with Dr. Valentin (I have already messaged our clinic nurse).    Defer dispo to primary team  -Remainder of care per primary team.   -Page with questions/concerns        Hilton Sullivan MD  Otorhinolaryngology-Head & Neck Surgery  [facility]

## 2020-03-19 NOTE — DISCHARGE INSTRUCTIONS
Wound care to wound.      No bathing or submerging wound in sitting water. OK to shower, running water only. Gently pad dry. May clean incision site with peroxide daily to debride crusting. May apply Aquaphor or bacitracin to wound twice daily. Change gauze as needed

## 2020-03-19 NOTE — PLAN OF CARE
Sitting up in bed, pleasant mood, watching tv. Drsg to left neck clean and dry. Breathing even and unlabored.

## 2020-03-20 ENCOUNTER — TELEPHONE (OUTPATIENT)
Dept: OTOLARYNGOLOGY | Facility: CLINIC | Age: 30
End: 2020-03-20

## 2020-03-23 LAB — BACTERIA SPEC ANAEROBE CULT: NORMAL

## 2020-03-24 ENCOUNTER — OFFICE VISIT (OUTPATIENT)
Dept: OTOLARYNGOLOGY | Facility: CLINIC | Age: 30
End: 2020-03-24
Payer: MEDICAID

## 2020-03-24 VITALS
WEIGHT: 112.44 LBS | SYSTOLIC BLOOD PRESSURE: 131 MMHG | HEART RATE: 94 BPM | HEIGHT: 65 IN | TEMPERATURE: 99 F | DIASTOLIC BLOOD PRESSURE: 87 MMHG | BODY MASS INDEX: 18.73 KG/M2

## 2020-03-24 DIAGNOSIS — L08.9 NECK INFECTION: ICD-10-CM

## 2020-03-24 DIAGNOSIS — L02.11 CELLULITIS AND ABSCESS OF NECK: Primary | ICD-10-CM

## 2020-03-24 DIAGNOSIS — L03.221 CELLULITIS AND ABSCESS OF NECK: Primary | ICD-10-CM

## 2020-03-24 PROCEDURE — 99024 POSTOP FOLLOW-UP VISIT: CPT | Mod: ,,, | Performed by: OTOLARYNGOLOGY

## 2020-03-24 PROCEDURE — 99213 OFFICE O/P EST LOW 20 MIN: CPT | Mod: PBBFAC | Performed by: OTOLARYNGOLOGY

## 2020-03-24 PROCEDURE — 99999 PR PBB SHADOW E&M-EST. PATIENT-LVL III: CPT | Mod: PBBFAC,,, | Performed by: OTOLARYNGOLOGY

## 2020-03-24 PROCEDURE — 99999 PR PBB SHADOW E&M-EST. PATIENT-LVL III: ICD-10-PCS | Mod: PBBFAC,,, | Performed by: OTOLARYNGOLOGY

## 2020-03-24 PROCEDURE — 99024 PR POST-OP FOLLOW-UP VISIT: ICD-10-PCS | Mod: ,,, | Performed by: OTOLARYNGOLOGY

## 2020-03-24 NOTE — PROGRESS NOTES
"  Subjective:      Dee is a 29 y.o. female who comes for follow-up after hospitalization for a left neck abscess.  She underwent I&D by me on 3/17/20 and was discharged home 2 days later after ruling out for COVID-19.  Since then taking clindamycin with gradual decrease in swelling and pain.  Over past day has increase of tenderness in lower left neck.  Incision nontender, keeping covered with triple-antibiotic ointment and gauze.  No drainage or fever.      The patient's medications, allergies, past medical, surgical, social and family histories were reviewed and updated as appropriate.    A detailed review of systems was obtained with pertinent positives as per the above HPI, and otherwise negative.        Objective:     /87   Pulse 94   Temp 98.9 °F (37.2 °C) (Oral)   Ht 5' 5" (1.651 m)   Wt 51 kg (112 lb 7 oz)   LMP 02/28/2020 (Exact Date)   BMI 18.71 kg/m²        Constitutional:   She appears well-developed. She is cooperative. No hoarse voice.      Head:  Normocephalic. Salivary glands normal.      Nose:  No rhinorrhea.     Mouth/Throat    Not examined due to patient wearing respirator mask per COVID-19 precautions.      Neck:  Neck normal without thyromegaly masses, asymmetry, normal tracheal structure, crepitus, and tenderness, thyroid normal, trachea normal and no adenopathy. Normal range of motion present.     She has no cervical adenopathy.   Left neck incision intact, no drainage, no erythema.  Sutures removed today.  Clean dressing applied.  Mild tenderness over left jugular chain.     Pulmonary/Chest:   Effort normal.       Procedure    None        Data Reviewed    Flow cytometry report showed polyclonal populations of B- and T-lymphocytes without structural abnormalities.  Histopathology pending.    Cultures positive only for Group A strep.  AFB and fungus negative.      Assessment:     1. Cellulitis and abscess of neck         Plan:     Wound healing well, no evidence of abscess " reformation.  Discussed local wound care.  Concern for possible odontogenic source, relates history of prior dental decay and retained wisdom teeth.  Directed to [employer] for direction since many dentists are closed due to COVID-19 pandemic.  Also needs rheumatology evaluation given 10 years of recurrent unexplained systemic and localized issues such as swelling, rashes and sialoadenitis.  Will attempt to facilitate virtual visit if possible.  Also discussed need to establish PCP and will facilitate this.  Follow up if symptoms worsen or fail to improve.

## 2020-03-27 ENCOUNTER — TELEPHONE (OUTPATIENT)
Dept: OTOLARYNGOLOGY | Facility: CLINIC | Age: 30
End: 2020-03-27

## 2020-03-28 ENCOUNTER — NURSE TRIAGE (OUTPATIENT)
Dept: ADMINISTRATIVE | Facility: CLINIC | Age: 30
End: 2020-03-28

## 2020-03-28 NOTE — TELEPHONE ENCOUNTER
Spoke with pt: was released from hospital with infected abscess lymph node and on abx for 1 week. Finished thursday abx--was on clindamycin . Rash started last night. Spoke with doctor on call this am for Marcelle. Was told to take benadryl took 40mg at 1330 and then another dose of 50 mg 1 hour ago. It is not changing. Still itchy, using hydrocortisone cream.to chest, up and down arms. And to face. No throat narrowing, no throat pain. Not raised hives, tiny red speckles. On face is blotchy. Took pantoprazole for a couple of days. To help with GERD on antibiotics. Had chest tightness and SOB --report as anxiety not furthering reaction. Pt is not alone.    protocol instructions given and pt verbalizes understanding.   S/s to go to ED for given. And pt verbalizes understanding.     Reason for Disposition   Rash started within 3 days after antibiotic stopped    Additional Information   Negative: [1] Life-threatening reaction (anaphylaxis) in the past to the same drug AND [2] < 2 hours since exposure   Negative: Difficulty breathing or wheezing   Negative: [1] Hoarseness or cough AND [2] started soon after 1st dose of drug   Negative: [1] Swollen tongue AND [2] started soon after 1st dose of drug   Negative: [1] Purple or blood-colored rash (spots or dots) AND [2] fever   Negative: Sounds like a life-threatening emergency to the triager   Negative: [1] Widespread hives AND [2] onset < 2 hours of exposure to 1st dose of drug   Negative: Swollen tongue   Negative: Fever   Negative: Patient sounds very sick or weak to the triager   Negative: [1] Purple or blood-colored rash (spots or dots) AND [2] no fever AND [3] sounds well to triager   Negative: [1] Taking new prescription medication AND [2] rash within 4 hours of 1st dose   Negative: Large or small blisters on skin (i.e., fluid filled bubbles or sacs)   Negative: Bloody crusts on lips or sores in mouth   Negative: Face or lip swelling   Negative: Hives  or itching   Negative: Taking new prescription antibiotic (EXCEPTION: finished taking new prescription antibiotic)   Negative: Taking new prescription medicine  (EXCEPTIONs: finished taking new prescription antibiotic OR questions about flushing from niacin)    Protocols used: RASH - WIDESPREAD ON DRUGS-A-AH

## 2020-03-30 ENCOUNTER — TELEPHONE (OUTPATIENT)
Dept: OTOLARYNGOLOGY | Facility: CLINIC | Age: 30
End: 2020-03-30

## 2020-04-01 ENCOUNTER — TELEPHONE (OUTPATIENT)
Dept: OTOLARYNGOLOGY | Facility: CLINIC | Age: 30
End: 2020-04-01

## 2020-04-07 ENCOUNTER — NURSE TRIAGE (OUTPATIENT)
Dept: ADMINISTRATIVE | Facility: CLINIC | Age: 30
End: 2020-04-07

## 2020-04-07 NOTE — TELEPHONE ENCOUNTER
"Pt c/o arm discomfort and swelling where she think her vein is. Pt states she was recently in the hospital and had received multiple IVs and multiple medications. Pt states there are two small bumps on her forearm that were not previously there. Denies any fever or severe pain. Per triage protocol, pt able to continue to treat at home. Provided with [facility] care information as well.     Reason for Disposition   Pain, redness, or swelling at intravenous (IV) site or along course of vein   Small painless lump at prior IV site    Additional Information   Negative: Shock suspected (e.g., cold/pale/clammy skin, too weak to stand, low BP, rapid pulse)   Negative: [1] Similar pain previously AND [2] it was from "heart attack"   Negative: [1] Similar pain previously AND [2] it was from "angina" AND [3] not relieved by nitroglycerin   Negative: Sounds like a life-threatening emergency to the triager   Negative: Followed an arm injury   Negative: Chest pain   Negative: Severe difficulty breathing (e.g., struggling for each breath, speaks in single words)   Negative: Shock suspected (e.g., cold/pale/clammy skin, too weak to stand, low BP, rapid pulse)   Negative: Sounds like a life-threatening emergency to the triager   Negative: IV not running or running slowly   Negative: [1] Difficulty breathing AND [2] not severe   Negative: Arm is swollen, new onset (or leg swelling if IV in lower extremity)   Negative: Fever > 100.5 F (38.1 C)   Negative: Patient sounds very sick or weak to the triager   Negative: Pus or cloudy fluid from IV site   Negative: [1] Red streak at IV site AND [2] palpable cord   Negative: [1] Red streak at IV site AND [2] longer than 1 inch (2.5 cm)   Negative: [1] Skin redness AND [2] extends > 1 inch (2.5 cm) from IV site   Negative: Skin swelling at IV site   Negative: [1] Raised bruise at IV site AND [2] size > 2 inches (5 cm) AND [3] expanding   Negative: [1] Pain at IV " site or shooting up arm AND [2] IV running slowly   Negative: [1] Mild bleeding at IV site AND [2] not stopped after following CARE ADVICE   Negative: [1] Dressing needs to be changed (e.g., wet, soiled, loose, or open to air) AND [2] unable or unwilling to perform dressing change   Negative: [1] Small area of skin redness at IV site (<1 inch or 2.5 cm) AND [2] no fever, swelling   Negative: [1] Pain at IV site or shooting up arm AND [2] NO redness or swelling AND [3]  IV running normally   Negative: [1] Pain at IV site or shooting up arm AND [2] NO redness or swelling AND [3] IV has been removed    Protocols used: ARM PAIN-A-AH, IV SITE (SKIN) SYMPTOMS-A-AH

## 2020-04-20 LAB — FUNGUS SPEC CULT: NORMAL

## 2020-04-30 LAB
COMMENT: NORMAL
FINAL PATHOLOGIC DIAGNOSIS: NORMAL
GROSS: NORMAL
Lab: NORMAL
MICROSCOPIC EXAM: NORMAL
SUPPLEMENTAL DIAGNOSIS: NORMAL

## 2020-05-15 ENCOUNTER — TELEPHONE (OUTPATIENT)
Dept: UROGYNECOLOGY | Facility: CLINIC | Age: 30
End: 2020-05-15

## 2020-05-15 NOTE — TELEPHONE ENCOUNTER
----- Message from Suzy Turpin sent at 5/15/2020  2:51 PM CDT -----  Contact: pt  Can the clinic reply in [facility]: n      Please refill the medication(s) listed below. Please call the patient when the prescription(s) is ready for  at this phone number  [phone]      Medication #1 ISIBLOOM 0.15-0.03 mg per tablet    Medication #2       Preferred Pharmacy:      [id number] [address] [address]  Phone: [phone] Fax: [phone]

## 2020-05-19 LAB
ACID FAST MOD KINY STN SPEC: NORMAL
MYCOBACTERIUM SPEC QL CULT: NORMAL

## 2020-05-23 RX ORDER — DESOGESTREL AND ETHINYL ESTRADIOL 0.15-0.03
1 KIT ORAL DAILY
Qty: 28 TABLET | Refills: 2 | Status: SHIPPED | OUTPATIENT
Start: 2020-05-23 | End: 2020-08-14 | Stop reason: SDUPTHER

## 2020-05-23 NOTE — TELEPHONE ENCOUNTER
Patient called weekend emergency line for refill on OCPs. Encouraged to follow up with primary OB for appointment. Refill sent x 3 months. Patient has no questions at this time.    Daxa Wells M.D.  OB/GYN PGY-1

## 2020-08-11 ENCOUNTER — TELEPHONE (OUTPATIENT)
Dept: UROGYNECOLOGY | Facility: CLINIC | Age: 30
End: 2020-08-11

## 2020-08-11 NOTE — TELEPHONE ENCOUNTER
----- Message from Caroline Avilez sent at 8/11/2020 11:34 AM CDT -----  Name of Who is Calling: CHANDNI HINOJOSA [[id number]]    What is the request in detail: Patient states she is still concerned about coming in but she needs a her birth control refilled. She would like to know if the prescription can be extended or if it's absolutely necessary for her to be seen at this time. Please contact to further discuss and advise      Can the clinic reply by [facility]: no    What Number to Call Back [facility] not [facility]: [phone]

## 2020-08-14 RX ORDER — DESOGESTREL AND ETHINYL ESTRADIOL 0.15-0.03
1 KIT ORAL DAILY
Qty: 28 TABLET | Refills: 2 | Status: SHIPPED | OUTPATIENT
Start: 2020-08-14 | End: 2020-10-02 | Stop reason: SDUPTHER

## 2020-08-14 NOTE — TELEPHONE ENCOUNTER
----- Message from Brennen Mills sent at 8/13/2020  4:21 PM CDT -----  Patient is still waiting on a phone call , patient needs to get scheduled for her annual and she's also running out of her birth control and she needs to get it refilled asap. Patient phone number [phone]. Thanks!

## 2020-10-02 ENCOUNTER — OFFICE VISIT (OUTPATIENT)
Dept: UROGYNECOLOGY | Facility: CLINIC | Age: 30
End: 2020-10-02
Payer: MEDICAID

## 2020-10-02 VITALS
WEIGHT: 120.81 LBS | BODY MASS INDEX: 20.13 KG/M2 | HEIGHT: 65 IN | SYSTOLIC BLOOD PRESSURE: 116 MMHG | DIASTOLIC BLOOD PRESSURE: 60 MMHG

## 2020-10-02 DIAGNOSIS — B07.9 VIRAL WARTS, UNSPECIFIED TYPE: ICD-10-CM

## 2020-10-02 DIAGNOSIS — Z12.4 ENCOUNTER FOR SCREENING FOR CERVICAL CANCER: ICD-10-CM

## 2020-10-02 DIAGNOSIS — Z01.419 WELL WOMAN EXAM: Primary | ICD-10-CM

## 2020-10-02 DIAGNOSIS — Z30.41 ENCOUNTER FOR SURVEILLANCE OF CONTRACEPTIVE PILLS: ICD-10-CM

## 2020-10-02 PROCEDURE — 88175 CYTOPATH C/V AUTO FLUID REDO: CPT

## 2020-10-02 PROCEDURE — 99395 PR PREVENTIVE VISIT,EST,18-39: ICD-10-PCS | Mod: S$PBB,,, | Performed by: NURSE PRACTITIONER

## 2020-10-02 PROCEDURE — 99395 PREV VISIT EST AGE 18-39: CPT | Mod: S$PBB,,, | Performed by: NURSE PRACTITIONER

## 2020-10-02 PROCEDURE — 99999 PR PBB SHADOW E&M-EST. PATIENT-LVL III: ICD-10-PCS | Mod: PBBFAC,,, | Performed by: NURSE PRACTITIONER

## 2020-10-02 PROCEDURE — 99213 OFFICE O/P EST LOW 20 MIN: CPT | Mod: PBBFAC | Performed by: NURSE PRACTITIONER

## 2020-10-02 PROCEDURE — 99999 PR PBB SHADOW E&M-EST. PATIENT-LVL III: CPT | Mod: PBBFAC,,, | Performed by: NURSE PRACTITIONER

## 2020-10-02 RX ORDER — DESOGESTREL AND ETHINYL ESTRADIOL 0.15-0.03
1 KIT ORAL DAILY
Qty: 84 TABLET | Refills: 3 | Status: SHIPPED | OUTPATIENT
Start: 2020-10-02 | End: 2021-09-08 | Stop reason: SDUPTHER

## 2020-10-02 RX ORDER — IMIQUIMOD 12.5 MG/.25G
CREAM TOPICAL
Qty: 24 PACKET | Refills: 1 | Status: SHIPPED | OUTPATIENT
Start: 2020-10-02

## 2020-10-02 NOTE — PATIENT INSTRUCTIONS
1. Well woman  --pap today  --takes 2-3 weeks for results    2. Contraception  --continue ocp's    3.wart vs. molluscum on R labia  5% cream: Apply a thin layer 3 times per week (on alternate days) prior to bedtime; leave on skin for 6 to 10 hours, then remove with mild soap and water. Continue until there is total clearance of the genital/perianal warts or for a maximum duration of therapy of 16 weeks.     4. RTC 1 year

## 2020-10-02 NOTE — PROGRESS NOTES
10/02/2020    SUBJECTIVE:   30 y.o. female for annual exam.    Past Medical History:   Diagnosis Date    Pneumothorax 2015    twice       Past Surgical History:   Procedure Laterality Date    INCISION AND DRAINAGE OF ABSCESS Left 3/17/2020    Procedure: INCISION AND DRAINAGE, ABSCESS NECK;  Surgeon: Tal Valentin MD;  Location: [facility] OR [address] [place] [address];  Service: ENT;  Laterality: Left;  Class B       Current Outpatient Medications   Medication Sig Dispense Refill    desogestreL-ethinyl estradioL (APRI) 0.15-0.03 mg per tablet Take 1 tablet by mouth once daily. 84 tablet 3    ondansetron (ZOFRAN-ODT) 4 MG TbDL Take 1 tablet (4 mg total) by mouth every 6 (six) hours as needed (nausea or vomiting). 40 tablet 0    acetaminophen (TYLENOL) 325 MG tablet Take 2 tablets (650 mg total) by mouth every 6 (six) hours as needed for Pain. (Patient not taking: Reported on 10/2/2020) 120 tablet 0    bacitracin 500 unit/gram Oint Apply topically 2 (two) times daily. (Patient not taking: Reported on 3/24/2020)  0    imiquimod (ALDARA) 5 % cream Apply topically 3 (three) times a week. Apply a thin layer 3 times per week (on alternate days) prior to bedtime; leave on skin for 6 to 10 hours, then remove with mild soap and water. Continue until there is total clearance of the genital/perianal warts or for a maximum duration of therapy of 16 weeks. 24 packet 1     No current facility-administered medications for this visit.      Allergies: Clindamycin   Patient's last menstrual period was 2020 (approximate).      OB History        0    Para   0    Term   0       0    AB   0    Living   0       SAB   0    TAB   0    Ectopic   0    Multiple   0    Live Births   0                   Well Woman:  Pap:2017 normal  Mammo:n/a  Colonoscopy:n/a  Dexa:n/a      Family History  Family History   Problem Relation Age of Onset    No Known Problems Mother     No Known Problems Father     Cervical cancer Neg Hx      "Endometrial cancer Neg Hx     Vaginal cancer Neg Hx     Ovarian cancer Neg Hx       Colon CA: No  Breast CA: No   CA: No      ROS:  Feeling well.   No dyspnea or chest pain on exertion.    No abdominal pain, change in bowel habits, black or bloody stools.    No urinary tract symptoms.   GYN ROS: normal menses, no abnormal bleeding, pelvic pain or discharge, no breast pain or new or enlarging lumps on self exam.   No neurological complaints.       OBJECTIVE:   The patient appears well, alert, oriented x 3, in no distress.  /60 (BP Location: Right arm, Patient Position: Sitting)   Ht 5' 5" (1.651 m)   Wt 54.8 kg (120 lb 13 oz)   LMP 09/24/2020 (Approximate)   BMI 20.10 kg/m²   ENT normal.  Neck supple. No adenopathy or thyromegaly. SHERLYN.   Lungs are clear, good air entry, no wheezes, rhonchi or rales.   S1 and S2 normal, no murmurs, regular rate and rhythm.   Abdomen soft without tenderness, guarding, mass or organomegaly.   Extremities show no edema, normal peripheral pulses.   Neurological is normal, no focal findings.    BREAST EXAM: breasts appear normal, no suspicious masses, no skin or nipple changes or axillary nodes    PELVIC EXAM:   VULVA: normal appearing vulva with no masses, tenderness or lesions, 2 warts noted --one near anus, one on R labia near groin  VAGINA: normal appearing vagina with normal color and discharge, no lesions,  CERVIX: normal appearing cervix without discharge or lesions, eversion noted--non friable  UTERUS: uterus is normal size, shape, consistency and nontender,   ADNEXA: no masses,       ASSESSMENT:   1. Well woman exam     2. Encounter for surveillance of contraceptive pills     3. Viral warts, unspecified type     4. Encounter for screening for cervical cancer   Liquid-Based Pap Smear, Screening       PLAN:   1. Well woman  --pap today  --takes 2-3 weeks for results    2. Contraception  --continue ocp's    3.wart vs. molluscum on R labia  5% cream: Apply a thin layer " 3 times per week (on alternate days) prior to bedtime; leave on skin for 6 to 10 hours, then remove with mild soap and water. Continue until there is total clearance of the genital/perianal warts or for a maximum duration of therapy of 16 weeks.     4. RTC 1 year      30 minutes were spent in face to face time with this patient  75 % of this time was spent in counseling and/or coordination of care  Althea GAN [facility]  Division of Female Pelvic Medicine and Reconstructive Surgery  Department of Obstetrics & Gynecology

## 2020-10-16 ENCOUNTER — TELEPHONE (OUTPATIENT)
Dept: UROGYNECOLOGY | Facility: CLINIC | Age: 30
End: 2020-10-16

## 2020-10-16 NOTE — TELEPHONE ENCOUNTER
----- Message from Andrei Choudhary sent at 10/16/2020  3:50 PM CDT -----  Name of Who is Calling: CHANDNI HINOJOSA [[id number]]    What is the request in detail: CHANDNI HINOJOSA [[id number]] is calling in regards to RX desogestreL-ethinyl estradioL (APRI) 0.15-0.03 mg per tabletPlease contact to further discuss and advise      Can the clinic reply by [facility]: yes     What Number to [facility] Back [facility] not [facility]:  [phone] (home)

## 2020-10-16 NOTE — TELEPHONE ENCOUNTER
Pt stated her insurance company won't cover the imiquimod (ALDARA) 5 % cream, it will cost her $700 out of pocket. Informed pt to contact her insurance company to what alternative is covered and contact the office back. Pt voiced understanding and call ended.

## 2020-10-19 ENCOUNTER — TELEPHONE (OUTPATIENT)
Dept: UROGYNECOLOGY | Facility: CLINIC | Age: 30
End: 2020-10-19

## 2020-10-19 DIAGNOSIS — A63.0 VULVAR WARTS: Primary | ICD-10-CM

## 2020-10-19 RX ORDER — IMIQUIMOD 12.5 MG/.25G
CREAM TOPICAL
Qty: 24 PACKET | Refills: 1 | Status: SHIPPED | OUTPATIENT
Start: 2020-10-19

## 2020-10-19 NOTE — TELEPHONE ENCOUNTER
----- Message from Nani Huber sent at 10/19/2020 12:42 PM CDT -----  Name of Who is Calling: Patient     What is the request in detail:Pt is calling regards to medication being coded wrong and her insurance will not cover the meds until a new prescription is sent in the for  imiquimod (ALDARA) 5 % cream pt states she is requesting a call back ...Please contact to further discuss and advise      Can the clinic reply by [facility]: No     What Number to Call Back if not in [facility]: [phone] (home)

## 2020-10-19 NOTE — TELEPHONE ENCOUNTER
Returned pt call no answer, Left voice message for pt to give the office a call back at [phone].      Pt is calling regards to medication being coded wrong and her insurance will not cover the meds until a new prescription is sent in the for  imiquimod (ALDARA) 5 % cream pt states she is requesting a call back ...Please contact to further discuss and advise

## 2020-10-24 LAB
FINAL PATHOLOGIC DIAGNOSIS: NORMAL
Lab: NORMAL

## 2020-10-26 ENCOUNTER — PATIENT MESSAGE (OUTPATIENT)
Dept: UROGYNECOLOGY | Facility: CLINIC | Age: 30
End: 2020-10-26

## 2021-09-08 RX ORDER — DESOGESTREL AND ETHINYL ESTRADIOL 0.15-0.03
1 KIT ORAL DAILY
Qty: 84 TABLET | Refills: 0 | Status: SHIPPED | OUTPATIENT
Start: 2021-09-08 | End: 2021-09-10 | Stop reason: SDUPTHER

## 2021-09-10 ENCOUNTER — TELEPHONE (OUTPATIENT)
Dept: UROGYNECOLOGY | Facility: CLINIC | Age: 31
End: 2021-09-10

## 2021-09-10 RX ORDER — DESOGESTREL AND ETHINYL ESTRADIOL 0.15-0.03
1 KIT ORAL DAILY
Qty: 84 TABLET | Refills: 2 | Status: SHIPPED | OUTPATIENT
Start: 2021-09-10 | End: 2021-12-05

## 2021-12-05 RX ORDER — DESOGESTREL AND ETHINYL ESTRADIOL 0.15-0.03
KIT ORAL
Qty: 28 TABLET | Refills: 2 | Status: SHIPPED | OUTPATIENT
Start: 2021-12-05 | End: 2022-09-07 | Stop reason: SDUPTHER

## 2022-09-07 DIAGNOSIS — Z30.41 ENCOUNTER FOR SURVEILLANCE OF CONTRACEPTIVE PILLS: Primary | ICD-10-CM

## 2022-09-07 RX ORDER — DESOGESTREL AND ETHINYL ESTRADIOL 0.15-0.03
1 KIT ORAL DAILY
Qty: 28 TABLET | Refills: 2 | Status: CANCELLED | OUTPATIENT
Start: 2022-09-07

## 2022-09-07 RX ORDER — DESOGESTREL AND ETHINYL ESTRADIOL 0.15-0.03
1 KIT ORAL DAILY
Qty: 28 TABLET | Refills: 1 | Status: SHIPPED | OUTPATIENT
Start: 2022-09-07 | End: 2022-10-12 | Stop reason: SDUPTHER

## 2022-09-07 NOTE — TELEPHONE ENCOUNTER
----- Message from Shayne Toscano sent at 9/7/2022  1:04 PM CDT -----      Can the clinic reply in [facility]:N        Please refill the medication(s) listed below. Please call the patient when the prescription(s) is ready for  at this phone number         Medication #1 ISIBLOOM 0.15-0.03 mg per tablet    Medication #2       Preferred Pharmacy: [facility] #[id number] [place] [address] [address] [facility]

## 2022-09-07 NOTE — TELEPHONE ENCOUNTER
Pt is requesting a rx refill for Isibloom 0.15-0.03 mg per tablet within the next 48 hrs due to being completely out, she was scheduled for annual office visit 10/12/22 at 4 pm.

## 2022-09-07 NOTE — TELEPHONE ENCOUNTER
----- Message from Shayne Toscano sent at 9/7/2022  1:06 PM CDT -----      Name of Who is Calling: CHANDNI HINOJOSA [[id number]]      What is the request in detail: Pt called to schedule annual.Please contact to further discuss and advise.          Can the clinic reply by [facility]: N      What Number to Call Back [facility]: [phone]

## 2022-10-05 ENCOUNTER — TELEPHONE (OUTPATIENT)
Dept: UROLOGY | Facility: CLINIC | Age: 32
End: 2022-10-05
Payer: MEDICAID

## 2022-10-05 DIAGNOSIS — Z30.41 ENCOUNTER FOR SURVEILLANCE OF CONTRACEPTIVE PILLS: ICD-10-CM

## 2022-10-05 RX ORDER — DESOGESTREL AND ETHINYL ESTRADIOL 0.15-0.03
KIT ORAL
Qty: 84 TABLET | Refills: 0 | OUTPATIENT
Start: 2022-10-05

## 2022-10-06 RX ORDER — DESOGESTREL AND ETHINYL ESTRADIOL 0.15-0.03
1 KIT ORAL DAILY
Qty: 28 TABLET | Refills: 0 | Status: SHIPPED | OUTPATIENT
Start: 2022-10-06 | End: 2023-10-06

## 2022-10-06 NOTE — TELEPHONE ENCOUNTER
----- Message from Gregorio Emmanuel sent at 10/6/2022 10:05 AM CDT -----  Name Of Caller: Althea        Provider Name: Althea Hinojosa NP        Does patient feel the need to be seen today? no        Relationship to the Pt?:patient        Contact Preference?: [phone]        What is the nature of the call?: Patient states that she needs to speak with someone in the office to get the status of her refill request for desogestreL-ethinyl estradioL (ISIBLOOM) 0.15-0.03 mg per tablet

## 2022-10-06 NOTE — TELEPHONE ENCOUNTER
Returned patient call regarding Isibloom refill.  Patient states she needs a one month refill of the medication for this weekend. Has refill appointment scheduled with Althea Hinojosa on 10/12. Needs pills to get her through until her appointment then. Will forward to provider for approval.

## 2022-10-12 ENCOUNTER — OFFICE VISIT (OUTPATIENT)
Dept: UROGYNECOLOGY | Facility: CLINIC | Age: 32
End: 2022-10-12
Payer: MEDICAID

## 2022-10-12 VITALS
DIASTOLIC BLOOD PRESSURE: 80 MMHG | SYSTOLIC BLOOD PRESSURE: 120 MMHG | HEIGHT: 65 IN | BODY MASS INDEX: 18.92 KG/M2 | WEIGHT: 113.56 LBS

## 2022-10-12 DIAGNOSIS — Z12.4 ENCOUNTER FOR SCREENING FOR CERVICAL CANCER: ICD-10-CM

## 2022-10-12 DIAGNOSIS — Z30.41 ENCOUNTER FOR SURVEILLANCE OF CONTRACEPTIVE PILLS: ICD-10-CM

## 2022-10-12 DIAGNOSIS — Z01.419 WELL WOMAN EXAM: Primary | ICD-10-CM

## 2022-10-12 PROCEDURE — 99395 PR PREVENTIVE VISIT,EST,18-39: ICD-10-PCS | Mod: S$PBB,,, | Performed by: NURSE PRACTITIONER

## 2022-10-12 PROCEDURE — 1160F PR REVIEW ALL MEDS BY PRESCRIBER/CLIN PHARMACIST DOCUMENTED: ICD-10-PCS | Mod: CPTII,,, | Performed by: NURSE PRACTITIONER

## 2022-10-12 PROCEDURE — 99213 OFFICE O/P EST LOW 20 MIN: CPT | Mod: PBBFAC | Performed by: NURSE PRACTITIONER

## 2022-10-12 PROCEDURE — 1159F MED LIST DOCD IN RCRD: CPT | Mod: CPTII,,, | Performed by: NURSE PRACTITIONER

## 2022-10-12 PROCEDURE — 1159F PR MEDICATION LIST DOCUMENTED IN MEDICAL RECORD: ICD-10-PCS | Mod: CPTII,,, | Performed by: NURSE PRACTITIONER

## 2022-10-12 PROCEDURE — 3079F DIAST BP 80-89 MM HG: CPT | Mod: CPTII,,, | Performed by: NURSE PRACTITIONER

## 2022-10-12 PROCEDURE — 99999 PR PBB SHADOW E&M-EST. PATIENT-LVL III: ICD-10-PCS | Mod: PBBFAC,,, | Performed by: NURSE PRACTITIONER

## 2022-10-12 PROCEDURE — 99999 PR PBB SHADOW E&M-EST. PATIENT-LVL III: CPT | Mod: PBBFAC,,, | Performed by: NURSE PRACTITIONER

## 2022-10-12 PROCEDURE — 1160F RVW MEDS BY RX/DR IN RCRD: CPT | Mod: CPTII,,, | Performed by: NURSE PRACTITIONER

## 2022-10-12 PROCEDURE — 87624 HPV HI-RISK TYP POOLED RSLT: CPT | Performed by: NURSE PRACTITIONER

## 2022-10-12 PROCEDURE — 3074F SYST BP LT 130 MM HG: CPT | Mod: CPTII,,, | Performed by: NURSE PRACTITIONER

## 2022-10-12 PROCEDURE — 88175 CYTOPATH C/V AUTO FLUID REDO: CPT | Performed by: NURSE PRACTITIONER

## 2022-10-12 PROCEDURE — 99395 PREV VISIT EST AGE 18-39: CPT | Mod: S$PBB,,, | Performed by: NURSE PRACTITIONER

## 2022-10-12 PROCEDURE — 3079F PR MOST RECENT DIASTOLIC BLOOD PRESSURE 80-89 MM HG: ICD-10-PCS | Mod: CPTII,,, | Performed by: NURSE PRACTITIONER

## 2022-10-12 PROCEDURE — 3074F PR MOST RECENT SYSTOLIC BLOOD PRESSURE < 130 MM HG: ICD-10-PCS | Mod: CPTII,,, | Performed by: NURSE PRACTITIONER

## 2022-10-12 RX ORDER — DESOGESTREL AND ETHINYL ESTRADIOL 0.15-0.03
1 KIT ORAL DAILY
Qty: 84 TABLET | Refills: 3 | Status: SHIPPED | OUTPATIENT
Start: 2022-10-12

## 2022-10-12 RX ORDER — HYDROXYCHLOROQUINE SULFATE 200 MG/1
200 TABLET, FILM COATED ORAL DAILY
COMMUNITY
Start: 2022-09-25

## 2022-10-12 RX ORDER — SODIUM FLUORIDE 5 MG/G
CREAM DENTAL NIGHTLY
COMMUNITY
Start: 2022-08-07

## 2022-10-12 RX ORDER — RABEPRAZOLE SODIUM 20 MG/1
20 TABLET, DELAYED RELEASE ORAL
COMMUNITY
Start: 2022-05-02 | End: 2023-05-02

## 2022-10-12 RX ORDER — CEVIMELINE HYDROCHLORIDE 30 MG/1
30 CAPSULE ORAL 3 TIMES DAILY
COMMUNITY
Start: 2022-10-05

## 2022-10-12 NOTE — PROGRESS NOTES
10/12/2022    SUBJECTIVE:   32 y.o. female for annual exam.    Past Medical History:   Diagnosis Date    Pneumothorax 2015    twice       Past Surgical History:   Procedure Laterality Date    INCISION AND DRAINAGE OF ABSCESS Left 3/17/2020    Procedure: INCISION AND DRAINAGE, ABSCESS NECK;  Surgeon: Tal Valentin MD;  Location: [facility] OR [address] [place] [address];  Service: ENT;  Laterality: Left;  Class B       Current Outpatient Medications   Medication Sig Dispense Refill    acetaminophen (TYLENOL) 325 MG tablet Take 2 tablets (650 mg total) by mouth every 6 (six) hours as needed for Pain. 120 tablet 0    cevimeline (EVOXAC) 30 mg capsule Take 30 mg by mouth 3 (three) times daily.      desogestreL-ethinyl estradioL (APRI) 0.15-0.03 mg per tablet Take 1 tablet by mouth once daily. 28 tablet 0    hydrOXYchloroQUINE (PLAQUENIL) 200 mg tablet Take 200 mg by mouth once daily.      imiquimod (ALDARA) 5 % cream Apply topically 3 (three) times a week. Apply a thin layer 3 times per week (on alternate days) prior to bedtime; leave on skin for 6 to 10 hours, then remove with mild soap and water. Continue until there is total clearance of the genital/perianal warts or for a maximum duration of therapy of 16 weeks. 24 packet 1    imiquimod (ALDARA) 5 % cream 5% cream: Apply a thin layer 3 times per week (on alternate days) prior to bedtime; leave on skin for 6 to 10 hours, then remove with mild soap and water. Continue until there is total clearance of the genital/perianal warts or for a maximum duration of therapy of 16 weeks. 24 packet 1    ondansetron (ZOFRAN-ODT) 4 MG TbDL Take 1 tablet (4 mg total) by mouth every 6 (six) hours as needed (nausea or vomiting). 40 tablet 0    RABEprazole (ACIPHEX) 20 mg tablet Take 20 mg by mouth.      SODIUM FLUORIDE 5000 PLUS 1.1 % Crea Apply topically every evening.      bacitracin 500 unit/gram Oint Apply topically 2 (two) times daily. (Patient not taking: No sig reported)  0    desogestreL-ethinyl  "estradioL (ISIBLOOM) 0.15-0.03 mg per tablet Take 1 tablet by mouth once daily. 84 tablet 3     No current facility-administered medications for this visit.     Allergies: Clindamycin and Prednisone   Patient's last menstrual period was 10/03/2022 (approximate).      OB History          0    Para   0    Term   0       0    AB   0    Living   0         SAB   0    IAB   0    Ectopic   0    Multiple   0    Live Births   0                   Well Woman:  Pap:10/2020 normal  Mammo:n/a  Colonoscopy:n/a  Dexa:n/a      Family History  Family History   Problem Relation Age of Onset    No Known Problems Mother     No Known Problems Father     Cervical cancer Neg Hx     Endometrial cancer Neg Hx     Vaginal cancer Neg Hx     Ovarian cancer Neg Hx       Colon CA: No  Breast CA: No   CA: No      ROS:  Feeling well.   No dyspnea or chest pain on exertion.    No abdominal pain, change in bowel habits, black or bloody stools.    No urinary tract symptoms.   GYN ROS: normal menses, no abnormal bleeding, pelvic pain or discharge, no breast pain or new or enlarging lumps on self exam.   No neurological complaints.   Followed by rheumatology for ? Sjogrens--followed at [facility] [place]    OBJECTIVE:   The patient appears well, alert, oriented x 3, in no distress.  /80 (BP Location: Left arm, Patient Position: Sitting, BP Method: Medium (Manual))   Ht 5' 5" (1.651 m)   Wt 51.5 kg (113 lb 8.6 oz)   LMP 10/03/2022 (Approximate)   BMI 18.89 kg/m²   ENT normal.  Neck supple. No adenopathy or thyromegaly. SHERLYN.   Normal respiratory effort  Pulse with regular rate and rhythm.   Abdomen soft without tenderness, guarding, mass or organomegaly.   Extremities show no edema, normal peripheral pulses.   Neurological is normal, no focal findings.    BREAST EXAM: breasts appear normal, no suspicious masses, no skin or nipple changes or axillary nodes    PELVIC EXAM:   VULVA: normal appearing vulva with no masses, tenderness or lesions, "   VAGINA: normal appearing vagina with normal color and discharge, no lesions,  CERVIX: normal appearing cervix without discharge or lesions,   UTERUS: uterus is normal size, shape, consistency and nontender,   ADNEXA: no masses,       ASSESSMENT:   1. Well woman exam        2. Encounter for screening for cervical cancer  HPV High Risk Genotypes, PCR    Liquid-Based Pap Smear, Screening      3. Encounter for surveillance of contraceptive pills  desogestreL-ethinyl estradioL (ISIBLOOM) 0.15-0.03 mg per tablet            PLAN:   1. Well woman  --pap today  --takes 2-3 weeks for results    2. Contraception  --continue ocp's    3. RTC 1 year      30 minutes were spent in face to face time with this patient  75 % of this time was spent in counseling and/or coordination of care    Althea GAN Ashish  [facility]  Division of Female Pelvic Medicine and Reconstructive Surgery  Department of Obstetrics & Gynecology

## 2022-10-19 LAB
CLINICAL INFO: NORMAL
CYTO CVX: NORMAL
CYTOLOGIST CVX/VAG CYTO: NORMAL
CYTOLOGIST CVX/VAG CYTO: NORMAL
CYTOLOGY CMNT CVX/VAG CYTO-IMP: NORMAL
CYTOLOGY PAP THIN PREP EXPLANATION: NORMAL
DATE OF PREVIOUS PAP: NO
DATE PREVIOUS BX: NO
GEN CATEG CVX/VAG CYTO-IMP: NORMAL
HPV I/H RISK 4 DNA CVX QL NAA+PROBE: NOT DETECTED
LMP START DATE: NORMAL
MICROORGANISM CVX/VAG CYTO: NORMAL
PATHOLOGIST CVX/VAG CYTO: NORMAL
SERVICE CMNT-IMP: NORMAL
SPECIMEN SOURCE CVX/VAG CYTO: NORMAL
STAT OF ADQ CVX/VAG CYTO-IMP: NORMAL

## 2022-10-20 ENCOUNTER — PATIENT MESSAGE (OUTPATIENT)
Dept: UROGYNECOLOGY | Facility: CLINIC | Age: 32
End: 2022-10-20
Payer: MEDICAID

## 2023-01-01 NOTE — ASSESSMENT & PLAN NOTE
30 y/o F with a left submandibular neck mass present since 3/11/19 and fevers since 3/10/19. CT scan shows diffuse lymphadenopathy with left hypodense rim enhancing collection which may represent left neck abscess versus necrotic/suppurative lymph node or necrotic lesion. WBC 17.6. Febrile. Currently on a course of clindamycin. No respiratory distress. Reported hoarseness compared to baseline. Flexible laryngoscopy deferred at this time due to COVID rule-out status. Concern for lymphoproliferative process (lymphoma?) vs suppurative lymphadenitis.    - defer flexible laryngoscopy for now, airway stable  - may consider consulting medicine to evaluate need for IV antibiotics  - possible drainage to be further evaluated at a later time  - discussed with staff, ENT dept to have a meeting regarding care for COVID-rule out patients   6

## (undated) DEVICE — TRAY MINOR GEN SURG

## (undated) DEVICE — DRESSING SPONGE 8PLY 4X4 STRL

## (undated) DEVICE — SUT VICRYL PLUS 3-0 SH 18IN

## (undated) DEVICE — CONTAINER SPECIMEN STRL 4OZ

## (undated) DEVICE — SEE MEDLINE ITEM 157128

## (undated) DEVICE — NDL HYPO REG 25G X 1 1/2

## (undated) DEVICE — ELECTRODE BLADE INSULATED 1 IN

## (undated) DEVICE — STAPLER SKIN PROXIMATE WIDE

## (undated) DEVICE — SEE MEDLINE ITEM 154981

## (undated) DEVICE — TRAY FOLEY 16FR INFECTION CONT

## (undated) DEVICE — DRAPE STERI INSTRUMENT 1018

## (undated) DEVICE — SPONGE LAP 18X18 PREWASHED

## (undated) DEVICE — PROBE CATH TEMP 16 FRFOLEY 400

## (undated) DEVICE — SEE MEDLINE ITEM 152622

## (undated) DEVICE — SUT LIGACLIP SMALL XTRA

## (undated) DEVICE — CLIP MED TICALL

## (undated) DEVICE — HOOK LONE STAR BLUNT 12MM

## (undated) DEVICE — CORD BIPOLAR 12 FOOT

## (undated) DEVICE — GAUZE SPONGE PEANUT STRL

## (undated) DEVICE — SHEET EENT SPLIT

## (undated) DEVICE — SUT 3-0 12-18IN SILK

## (undated) DEVICE — SKINMARKER & RULER REGULAR X-F

## (undated) DEVICE — SUT COATED VICRYL 4/0 27IN

## (undated) DEVICE — ELECTRODE REM PLYHSV RETURN 9

## (undated) DEVICE — SUT SILK 2-0 PS 18IN BLACK

## (undated) DEVICE — SUT 2-0 12-18IN SILK

## (undated) DEVICE — COVER LIGHT HANDLE 80/CA

## (undated) DEVICE — WARMER DRAPE STERILE LF

## (undated) DEVICE — BLADE SURG #15 CARBON STEEL

## (undated) DEVICE — ADHESIVE DERMABOND ADVANCED

## (undated) DEVICE — SEE MEDLINE ITEM 157194

## (undated) DEVICE — GOWN SURGICAL X-LARGE